# Patient Record
Sex: FEMALE | Race: OTHER | Employment: FULL TIME | URBAN - METROPOLITAN AREA
[De-identification: names, ages, dates, MRNs, and addresses within clinical notes are randomized per-mention and may not be internally consistent; named-entity substitution may affect disease eponyms.]

---

## 2017-11-02 PROBLEM — G89.29 CHRONIC BILATERAL THORACIC BACK PAIN: Status: ACTIVE | Noted: 2017-11-02

## 2017-11-02 PROBLEM — F90.0 ATTENTION DEFICIT HYPERACTIVITY DISORDER (ADHD), PREDOMINANTLY INATTENTIVE TYPE: Status: ACTIVE | Noted: 2017-11-02

## 2017-11-02 PROBLEM — M54.9 BACK PAIN: Status: ACTIVE | Noted: 2017-11-02

## 2017-11-02 PROBLEM — M54.6 CHRONIC BILATERAL THORACIC BACK PAIN: Status: ACTIVE | Noted: 2017-11-02

## 2017-11-02 PROBLEM — G47.01 INSOMNIA DUE TO MEDICAL CONDITION: Status: ACTIVE | Noted: 2017-11-02

## 2017-11-02 PROBLEM — F32.A DEPRESSION: Status: ACTIVE | Noted: 2017-11-02

## 2017-11-08 PROBLEM — Z86.59 HISTORY OF BULIMIA: Status: ACTIVE | Noted: 2017-11-08

## 2017-11-08 PROBLEM — A60.00 HERPES GENITALIA: Status: ACTIVE | Noted: 2017-11-08

## 2019-10-15 PROBLEM — Z01.419 WOMEN'S ANNUAL ROUTINE GYNECOLOGICAL EXAMINATION: Status: ACTIVE | Noted: 2019-10-15

## 2019-10-15 PROBLEM — N92.6 IRREGULAR MENSES: Status: ACTIVE | Noted: 2019-10-15

## 2019-10-15 PROBLEM — L68.0 HIRSUTISM: Status: ACTIVE | Noted: 2019-10-15

## 2019-10-29 PROBLEM — R87.810 CERVICAL HIGH RISK HPV (HUMAN PAPILLOMAVIRUS) TEST POSITIVE: Status: ACTIVE | Noted: 2019-10-29

## 2022-02-22 ENCOUNTER — HOSPITAL ENCOUNTER (EMERGENCY)
Age: 37
Discharge: HOME | End: 2022-02-22
Attending: EMERGENCY MEDICINE
Payer: OTHER MISCELLANEOUS

## 2022-02-22 VITALS
TEMPERATURE: 98.3 F | WEIGHT: 135 LBS | HEIGHT: 59 IN | OXYGEN SATURATION: 100 % | HEART RATE: 89 BPM | RESPIRATION RATE: 16 BRPM | BODY MASS INDEX: 27.21 KG/M2

## 2022-02-22 DIAGNOSIS — Z20.6 HIV EXPOSURE FROM BODY FLUIDS: Primary | ICD-10-CM

## 2022-02-22 PROCEDURE — 99282 EMERGENCY DEPT VISIT SF MDM: CPT

## 2022-02-22 NOTE — ED TRIAGE NOTES
Patient was pulling blood off central line of a confirmed HIV positive patinet this morning and possible with blood splashing into left eye after dropping syringe around 0600  . Patient advise she flushed eye x 3 at that time. Masked. No further complaints.

## 2022-02-22 NOTE — ED PROVIDER NOTES
HPI     Chidi Wood is 31 y.o. female who presents to the emergency department for evaluation after an HIV exposure. She works as a nurse in the ICU. She was drawing blood off a patient's central line and believes a dime sized amount of blood splashed in her eye. She washed her eye out with 3 saline flushes. She presents for needlestick protocol labs. She believes the patient is compliant with his antiretroviral medications. Past Medical History:   Diagnosis Date    Abnormal vaginal Pap smear     \"highest risk\" . Colposcopy negative.     Acne     ADHD     Back pain     Depression     History of HPV infection     Migraine        Past Surgical History:   Procedure Laterality Date    HX WISDOM TEETH EXTRACTION           Family History:   Problem Relation Age of Onset    Cancer Mother         cervical cancer     Hypertension Father        Social History     Socioeconomic History    Marital status: SINGLE     Spouse name: Not on file    Number of children: Not on file    Years of education: Not on file    Highest education level: Not on file   Occupational History    Occupation: nursing student   Tobacco Use    Smoking status: Never Smoker    Smokeless tobacco: Never Used   Substance and Sexual Activity    Alcohol use: Yes     Comment: very rare    Drug use: No    Sexual activity: Yes     Partners: Male     Birth control/protection: None   Other Topics Concern     Service Not Asked    Blood Transfusions Not Asked    Caffeine Concern Yes    Occupational Exposure Not Asked    Hobby Hazards Not Asked    Sleep Concern Not Asked    Stress Concern Not Asked    Weight Concern Not Asked    Special Diet Not Asked    Back Care Not Asked    Exercise No    Bike Helmet Not Asked    Seat Belt Yes    Self-Exams Yes   Social History Narrative    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    Abuse: Feels safe at home, no history of physical abuse, no history of sexual abuse Social Determinants of Health     Financial Resource Strain:     Difficulty of Paying Living Expenses: Not on file   Food Insecurity:     Worried About Running Out of Food in the Last Year: Not on file    Clive of Food in the Last Year: Not on file   Transportation Needs:     Lack of Transportation (Medical): Not on file    Lack of Transportation (Non-Medical): Not on file   Physical Activity:     Days of Exercise per Week: Not on file    Minutes of Exercise per Session: Not on file   Stress:     Feeling of Stress : Not on file   Social Connections:     Frequency of Communication with Friends and Family: Not on file    Frequency of Social Gatherings with Friends and Family: Not on file    Attends Sabianism Services: Not on file    Active Member of [address] [place] [address] [employer] or Organizations: Not on file    Attends Club or Organization Meetings: Not on file    Marital Status: Not on file   Intimate Partner Violence:     Fear of Current or Ex-Partner: Not on file    Emotionally Abused: Not on file    Physically Abused: Not on file    Sexually Abused: Not on file   Housing Stability:     Unable to Pay for Housing in the Last Year: Not on file    Number of [place] in the Last Year: Not on file    Unstable Housing in the Last Year: Not on file         ALLERGIES: Patient has no known allergies. Review of Systems   All other systems reviewed and are negative. Vitals:    02/22/22 1130   Pulse: 89   Resp: 16   Temp: 98.3 °F (36.8 °C)   SpO2: 100%   Weight: 61.2 kg (135 lb)   Height: 4' 11\" (1.499 m)            Physical Exam  Vitals and nursing note reviewed. Constitutional:       Appearance: Normal appearance. Eyes:      Pupils: Pupils are equal, round, and reactive to light. Cardiovascular:      Rate and Rhythm: Normal rate and regular rhythm. Pulmonary:      Effort: Pulmonary effort is normal.      Breath sounds: Normal breath sounds. Skin:     General: Skin is warm and dry.    Neurological: General: No focal deficit present. Mental Status: She is alert and oriented to person, place, and time. Psychiatric:         Mood and Affect: Mood normal.          MDM  Number of Diagnoses or Management Options  HIV exposure from body fluids: new and requires workup  Patient Progress  Patient progress: stable    ED Course as of 02/22/22 1337   Tue Feb 22, 2022   1320 Discussed and offered post exposure prophylaxis medications with the patient. She declines at this time. She will follow up with [facility] for further testing. Results, plan of care and return precautions discussed with the patient. They verbalize understanding and ability to comply.     [AE]      ED Course User Index  [AE] DESHAUN Ordaz       Procedures

## 2022-02-22 NOTE — ED NOTES
I have reviewed discharge instructions with the patient. The patient verbalized understanding. Patient left ED via Discharge Method: ambulatory to Home with self. Opportunity for questions and clarification provided. Patient given 0 scripts. To continue your aftercare when you leave the hospital, you may receive an automated call from our care team to check in on how you are doing. This is a free service and part of our promise to provide the best care and service to meet your aftercare needs.  If you have questions, or wish to unsubscribe from this service please call [phone]. Thank you for Choosing our [facility] Emergency Department.

## 2022-03-19 PROBLEM — F90.0 ATTENTION DEFICIT HYPERACTIVITY DISORDER (ADHD), PREDOMINANTLY INATTENTIVE TYPE: Status: ACTIVE | Noted: 2017-11-02

## 2022-03-19 PROBLEM — L68.0 HIRSUTISM: Status: ACTIVE | Noted: 2019-10-15

## 2022-03-19 PROBLEM — M54.6 CHRONIC BILATERAL THORACIC BACK PAIN: Status: ACTIVE | Noted: 2017-11-02

## 2022-03-19 PROBLEM — G89.29 CHRONIC BILATERAL THORACIC BACK PAIN: Status: ACTIVE | Noted: 2017-11-02

## 2022-03-19 PROBLEM — G47.01 INSOMNIA DUE TO MEDICAL CONDITION: Status: ACTIVE | Noted: 2017-11-02

## 2022-03-19 PROBLEM — N92.6 IRREGULAR MENSES: Status: ACTIVE | Noted: 2019-10-15

## 2022-03-19 PROBLEM — R87.810 CERVICAL HIGH RISK HPV (HUMAN PAPILLOMAVIRUS) TEST POSITIVE: Status: ACTIVE | Noted: 2019-10-29

## 2022-03-19 PROBLEM — F32.A DEPRESSION: Status: ACTIVE | Noted: 2017-11-02

## 2022-03-20 PROBLEM — A60.00 HERPES GENITALIA: Status: ACTIVE | Noted: 2017-11-08

## 2022-03-20 PROBLEM — Z86.59 HISTORY OF BULIMIA: Status: ACTIVE | Noted: 2017-11-08

## 2022-03-20 PROBLEM — Z01.419 WOMEN'S ANNUAL ROUTINE GYNECOLOGICAL EXAMINATION: Status: ACTIVE | Noted: 2019-10-15

## 2022-05-14 ENCOUNTER — HOSPITAL ENCOUNTER (EMERGENCY)
Age: 37
Discharge: HOME | End: 2022-05-14
Attending: STUDENT IN AN ORGANIZED HEALTH CARE EDUCATION/TRAINING PROGRAM
Payer: COMMERCIAL

## 2022-05-14 VITALS
WEIGHT: 134.92 LBS | TEMPERATURE: 98 F | OXYGEN SATURATION: 98 % | RESPIRATION RATE: 16 BRPM | HEART RATE: 88 BPM | DIASTOLIC BLOOD PRESSURE: 79 MMHG | BODY MASS INDEX: 27.2 KG/M2 | HEIGHT: 59 IN | SYSTOLIC BLOOD PRESSURE: 124 MMHG

## 2022-05-14 DIAGNOSIS — H10.31 ACUTE CONJUNCTIVITIS OF RIGHT EYE, UNSPECIFIED ACUTE CONJUNCTIVITIS TYPE: Primary | ICD-10-CM

## 2022-05-14 PROCEDURE — 99283 EMERGENCY DEPT VISIT LOW MDM: CPT

## 2022-05-14 PROCEDURE — 74011000250 HC RX REV CODE- 250: Performed by: STUDENT IN AN ORGANIZED HEALTH CARE EDUCATION/TRAINING PROGRAM

## 2022-05-14 PROCEDURE — 74011250637 HC RX REV CODE- 250/637: Performed by: STUDENT IN AN ORGANIZED HEALTH CARE EDUCATION/TRAINING PROGRAM

## 2022-05-14 RX ORDER — ERYTHROMYCIN 5 MG/G
OINTMENT OPHTHALMIC
Status: COMPLETED | OUTPATIENT
Start: 2022-05-14 | End: 2022-05-14

## 2022-05-14 RX ORDER — ERYTHROMYCIN 5 MG/G
OINTMENT OPHTHALMIC
Qty: 1 G | Refills: 0 | Status: SHIPPED | OUTPATIENT
Start: 2022-05-14

## 2022-05-14 RX ADMIN — ERYTHROMYCIN: 5 OINTMENT OPHTHALMIC at 02:42

## 2022-05-14 RX ADMIN — FLUORESCEIN SODIUM 1 STRIP: 1 STRIP OPHTHALMIC at 02:33

## 2022-05-14 NOTE — ED TRIAGE NOTES
Pt presents to the ED for c/o right eye pain that began earlier today. States that she was in the tanning bed and didn't wear her eye care.   Masked on arrival

## 2022-05-14 NOTE — DISCHARGE INSTRUCTIONS
Arrange follow-up with your eye provider as discussed. Use the topical antibiotic ointment as directed. Avoid contact use in the affected eye until symptoms improve. You should discard contacts use previously as a precaution.

## 2022-05-14 NOTE — ED NOTES
Pt states that she is having eye pain after being in a tanning bed without eye protection.  Right eye reddened and appears to be painful

## 2022-05-14 NOTE — ED NOTES
I have reviewed discharge instructions with the patient. The patient verbalized understanding. Patient left ED via Discharge Method: ambulatory to Home with , self,). Opportunity for questions and clarification provided. Patient given 1 scripts. To continue your aftercare when you leave the hospital, you may receive an automated call from our care team to check in on how you are doing. This is a free service and part of our promise to provide the best care and service to meet your aftercare needs.  If you have questions, or wish to unsubscribe from this service please call [phone]. Thank you for Choosing our [facility] Emergency Department.

## 2022-05-14 NOTE — ED PROVIDER NOTES
73-year-old female patient presents to this facility with reports of right eye irritation for the past day. Patient states she woke with symptoms today. She reports recent visit to the tanning bed and states she did not wear glasses or eye protection during the event. She denies significant pain but has noted redness and irritation in the right eye. Vision has been stable. She denies any significant discomfort with moving the eye. She does describe slight foreign body sensation generally through the eye. She has noticed some clear drainage from the affected eye. Left side is unremarkable. Of note, she did have short eyelashes placed within the past 2 days as well. Patient denies known sick contacts or allergies. She does use contact lenses. She is established with a local  with whom she follows up regularly. The history is provided by the patient. No  was used. Eye Pain   Associated symptoms include discharge and eye redness. Pertinent negatives include no photophobia and no pain. Past Medical History:   Diagnosis Date    Abnormal vaginal Pap smear     \"highest risk\" . Colposcopy negative.     Acne     ADHD     Back pain     Depression     History of HPV infection     Migraine        Past Surgical History:   Procedure Laterality Date    HX WISDOM TEETH EXTRACTION           Family History:   Problem Relation Age of Onset    Cancer Mother         cervical cancer     Hypertension Father        Social History     Socioeconomic History    Marital status: SINGLE     Spouse name: Not on file    Number of children: Not on file    Years of education: Not on file    Highest education level: Not on file   Occupational History    Occupation: nursing student   Tobacco Use    Smoking status: Never Smoker    Smokeless tobacco: Never Used   Substance and Sexual Activity    Alcohol use: Yes     Comment: very rare    Drug use: No    Sexual activity: Yes Partners: Male     Birth control/protection: None   Other Topics Concern     Service Not Asked    Blood Transfusions Not Asked    Caffeine Concern Yes    Occupational Exposure Not Asked    Hobby Hazards Not Asked    Sleep Concern Not Asked    Stress Concern Not Asked    Weight Concern Not Asked    Special Diet Not Asked    Back Care Not Asked    Exercise No    Bike Helmet Not Asked    Seat Belt Yes    Self-Exams Yes   Social History Narrative    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    Abuse: Feels safe at home, no history of physical abuse, no history of sexual abuse     Social Determinants of Health     Financial Resource Strain:     Difficulty of Paying Living Expenses: Not on file   Food Insecurity:     Worried About Running Out of Food in the Last Year: Not on file    Clive of Food in the Last Year: Not on file   Transportation Needs:     Lack of Transportation (Medical): Not on file    Lack of Transportation (Non-Medical): Not on file   Physical Activity:     Days of Exercise per Week: Not on file    Minutes of Exercise per Session: Not on file   Stress:     Feeling of Stress : Not on file   Social Connections:     Frequency of Communication with Friends and Family: Not on file    Frequency of Social Gatherings with Friends and Family: Not on file    Attends Mormonism Services: Not on file    Active Member of [address] [place] [address] [employer] or Organizations: Not on file    Attends Club or Organization Meetings: Not on file    Marital Status: Not on file   Intimate Partner Violence:     Fear of Current or Ex-Partner: Not on file    Emotionally Abused: Not on file    Physically Abused: Not on file    Sexually Abused: Not on file   Housing Stability:     Unable to Pay for Housing in the Last Year: Not on file    Number of [place] in the Last Year: Not on file    Unstable Housing in the Last Year: Not on file         ALLERGIES: Patient has no known allergies.     Review of Systems Eyes: Positive for discharge and redness. Negative for photophobia, pain, itching and visual disturbance. All other systems reviewed and are negative. Vitals:    05/14/22 0235   BP: 124/79   Pulse: 88   Resp: 16   Temp: 98 °F (36.7 °C)   SpO2: 98%   Weight: 61.2 kg (134 lb 14.7 oz)   Height: 4' 11\" (1.499 m)            Physical Exam  Vitals and nursing note reviewed. Constitutional:       Appearance: Normal appearance. HENT:      Head: Normocephalic and atraumatic. Nose: Nose normal.      Mouth/Throat:      Mouth: Mucous membranes are moist.   Eyes:      General: Lids are normal. Lids are everted, no foreign bodies appreciated. Vision grossly intact. Gaze aligned appropriately. No allergic shiner or scleral icterus. Right eye: Discharge present. No foreign body or hordeolum. Left eye: No foreign body, discharge or hordeolum. Extraocular Movements: Extraocular movements intact. Right eye: Normal extraocular motion and no nystagmus. Left eye: Normal extraocular motion and no nystagmus. Conjunctiva/sclera:      Right eye: Right conjunctiva is injected. No chemosis, exudate or hemorrhage. Left eye: Left conjunctiva is not injected. No chemosis, exudate or hemorrhage. Pupils: Pupils are equal, round, and reactive to light. Pupils are equal.      Comments: Slight though diffuse injection of the sclera on the right side. Some clear tearing is noted on my exam.  No significant photophobia, pupils equal round reactive to light bilaterally. Extraocular muscle movements are intact and painless. Is fluorescein uptake on the right side, left side is unremarkable. Cardiovascular:      Pulses: Normal pulses. Pulmonary:      Effort: Pulmonary effort is normal.   Abdominal:      General: Abdomen is flat. Musculoskeletal:         General: Normal range of motion. Cervical back: Normal range of motion. Skin:     General: Skin is warm and dry.       Capillary Refill: Capillary refill takes less than 2 seconds. Neurological:      General: No focal deficit present. Mental Status: She is alert. Psychiatric:         Mood and Affect: Mood normal.          MDM  Number of Diagnoses or Management Options  Acute conjunctivitis of right eye, unspecified acute conjunctivitis type: new and requires workup  Diagnosis management comments: Symptoms consistent with conjunctivitis versus keratitis from tanning bed. Reports minimal pain. Will treat with topical antibiotic ointment and refer to patient's outpatient eye provider. Voice dictation software was used during the making of this note. This software is not perfect and grammatical and other typographical errors may be present. This note has been proofread, but may still contain errors.   [address] [employer] [facility]; 5/14/2022 @2:47 AM   ===================================================================      Risk of Complications, Morbidity, and/or Mortality  Presenting problems: low  Diagnostic procedures: low  Management options: low           Procedures

## 2022-05-16 ENCOUNTER — PATIENT OUTREACH (OUTPATIENT)
Dept: OTHER | Age: 37
End: 2022-05-16

## 2022-08-01 ENCOUNTER — OFFICE VISIT (OUTPATIENT)
Dept: OCCUPATIONAL MEDICINE | Age: 37
End: 2022-08-01
Payer: COMMERCIAL

## 2022-08-01 VITALS
DIASTOLIC BLOOD PRESSURE: 73 MMHG | RESPIRATION RATE: 16 BRPM | TEMPERATURE: 98.8 F | HEIGHT: 71 IN | BODY MASS INDEX: 19.12 KG/M2 | OXYGEN SATURATION: 97 % | HEART RATE: 80 BPM | WEIGHT: 136.6 LBS | SYSTOLIC BLOOD PRESSURE: 111 MMHG

## 2022-08-01 DIAGNOSIS — J02.9 SORE THROAT: ICD-10-CM

## 2022-08-01 DIAGNOSIS — M26.609 TMJ (TEMPOROMANDIBULAR JOINT SYNDROME): ICD-10-CM

## 2022-08-01 DIAGNOSIS — B00.1 COLD SORE: Primary | ICD-10-CM

## 2022-08-01 LAB
GROUP A STREP ANTIGEN, POC: NEGATIVE
VALID INTERNAL CONTROL, POC: NORMAL

## 2022-08-01 PROCEDURE — 99213 OFFICE O/P EST LOW 20 MIN: CPT

## 2022-08-01 PROCEDURE — 87430 STREP A AG IA: CPT

## 2022-08-01 RX ORDER — VALACYCLOVIR HYDROCHLORIDE 1 G/1
1000 TABLET, FILM COATED ORAL 2 TIMES DAILY
Qty: 4 TABLET | Refills: 0 | Status: SHIPPED | OUTPATIENT
Start: 2022-08-01 | End: 2022-08-03

## 2022-08-01 RX ORDER — ACYCLOVIR 800 MG/1
TABLET ORAL
COMMUNITY
Start: 2022-07-19

## 2022-08-01 ASSESSMENT — ENCOUNTER SYMPTOMS
COUGH: 0
CHEST TIGHTNESS: 0
SHORTNESS OF BREATH: 0
BACK PAIN: 1
VOICE CHANGE: 0
SINUS PRESSURE: 1
EYE PAIN: 0
SINUS PAIN: 0
DIARRHEA: 0
NAUSEA: 0
TROUBLE SWALLOWING: 0
EYE ITCHING: 0
RHINORRHEA: 1
EYE DISCHARGE: 0
VOMITING: 0
SORE THROAT: 1
ABDOMINAL PAIN: 0
WHEEZING: 0
EYE REDNESS: 0
PHOTOPHOBIA: 0

## 2022-08-01 ASSESSMENT — VISUAL ACUITY: OU: 1

## 2022-08-01 NOTE — PROGRESS NOTES
PROGRESS NOTE    SUBJECTIVE     Kan Coelho is a 31 y.o. female seen for:    Chief Complaint    Pain     . Patient states that a week ago Saturday (7[place]23[place]22) [place] and then had a cold sore on Wednesday. Patient has been taking Abreva and acyclovir 400 BID for 5 days (finishing acyclovir today; prescribed by \"[facility]\") but it hasn't gotten any better. Patient states that she has a history of cold sores but hasn't had a cold sore in years and her cold sores are not usually triggered by sunlight. Patient states that her mouth is very irritated by the cold sore but that now her throat is hurting her (left side is worse) and it is hard to swallow. Patient also feels that her left lymph node is inflamed. Patient denies fevers but notes a headache behind her left eye. (Patient has a history of migraines/headaches.) Then, on Friday or Saturday, her right ear started hurting. Recently, the patient was diagnosed with PCOS and a uterine fibroid - she sees Dr. Joanne Roper (OBGYN) and is scheduled to have the uterine fibroid removed in September 2022; Starting Myfembree (contains Relugolix to reduce estrogen; Estradiol; and Norethindrone acetate) once it is approved by insurance but unitil then is still on oral combined birth control. In general, patient notes that she always has had trouble sleeping but has been on the night shift for the last 8 years. Patient admits to using benadryl for sleep but has also tried trazadone (didn't help); melatonin (but maxed out and was told to stop taking it). Patient feels like she has too much to think about when going to bed; has not tried meditation or therapy. Current Outpatient Medications   Medication Sig Dispense Refill    acyclovir (ZOVIRAX) 800 MG tablet Take 1/2 tablet (400mg) by mouth in the morning and evening each day for suppression for 90 days.       valACYclovir (VALTREX) 1 g tablet Take 1 tablet by mouth in the morning and 1 tablet before bedtime. Do all this for 2 days. 4 tablet 0    amphetamine-dextroamphetamine (ADDERALL XR) 10 MG extended release capsule Take 10 mg by mouth. amphetamine-dextroamphetamine (ADDERALL) 10 MG tablet Take 10 mg by mouth. Norethin Ace-Eth Estrad-FE 1-20 MG-MCG(24) CAPS Take 1 capsule by mouth daily       No current facility-administered medications for this visit. No Known Allergies  Past Medical History:   Diagnosis Date    Abnormal vaginal Pap smear     \"highest risk\" . Colposcopy negative. Acne     ADHD     Back pain     Depression     History of HPV infection     Migraine     PCOS (polycystic ovarian syndrome)     Subserous leiomyoma of uterus      Past Surgical History:   Procedure Laterality Date    WISDOM TOOTH EXTRACTION         Social History     Tobacco Use    Smoking status: Never    Smokeless tobacco: Never   Vaping Use    Vaping Use: Never used   Substance Use Topics    Alcohol use: Yes     Comment: Occassionally - 3 coctails every other weekends    Drug use: No        Family History   Problem Relation Age of Onset    Hypertension Father     Cancer Mother         cervical cancer        Review of Systems   Constitutional:  Positive for fatigue. Negative for chills, diaphoresis, fever and unexpected weight change. Weight gain over the past year; recent diagnosis of PCOS   HENT:  Positive for congestion, ear pain, mouth sores, rhinorrhea, sinus pressure and sore throat. Negative for ear discharge, postnasal drip, sinus pain, sneezing, trouble swallowing and voice change. Eyes:  Negative for photophobia, pain, discharge, redness, itching and visual disturbance. Respiratory:  Negative for cough, chest tightness, shortness of breath and wheezing. Cardiovascular:  Negative for chest pain and palpitations. Gastrointestinal:  Negative for abdominal pain, diarrhea, nausea and vomiting. Genitourinary:  Negative for dysuria and vaginal bleeding. Musculoskeletal:  Positive for back pain. Negative for gait problem, joint swelling, myalgias and neck stiffness. Patient has had middle of the back pain for several years; has done PT and it has helped somewhat   Skin:  Negative for rash. Neurological:  Positive for headaches. Psychiatric/Behavioral:  Positive for sleep disturbance. Negative for dysphoric mood and suicidal ideas. See HPI       OBJECTIVE    /73 (Site: Right Upper Arm, Position: Sitting, Cuff Size: Medium Adult)   Pulse 80   Temp 98.8 °F (37.1 °C)   Resp 16   Ht 5' 11\" (1.803 m)   Wt 136 lb 9.6 oz (62 kg)   LMP 07/24/2022 (Approximate)   SpO2 97%   BMI 19.05 kg/m²      Physical Exam  Vitals reviewed. Constitutional:       General: She is not in acute distress. Appearance: Normal appearance. She is not ill-appearing, toxic-appearing or diaphoretic. HENT:      Head: Normocephalic. Jaw: Tenderness and pain on movement present. No trismus or swelling. Comments: Pain upon opening and closing mouth (worse on the left side); facial movements symmetric; sensation intact and symmetric on both sides of face     Right Ear: Hearing, tympanic membrane, ear canal and external ear normal. There is no impacted cerumen. Left Ear: Hearing, tympanic membrane, ear canal and external ear normal. There is no impacted cerumen. Nose: Congestion and rhinorrhea present. Right Nostril: No foreign body, epistaxis or occlusion. Left Nostril: No foreign body, epistaxis or occlusion. Right Turbinates: Not swollen. Left Turbinates: Not swollen. Right Sinus: No maxillary sinus tenderness or frontal sinus tenderness. Left Sinus: No maxillary sinus tenderness or frontal sinus tenderness. Comments: Reddened turbinates     Mouth/Throat:      Lips: Lesions present. Mouth: Mucous membranes are moist.      Pharynx: Oropharynx is clear. Uvula midline.  Posterior oropharyngeal erythema present. No pharyngeal swelling, oropharyngeal exudate or uvula swelling. Tonsils: No tonsillar exudate or tonsillar abscesses. 2+ on the right. 2+ on the left. Comments: Cobblestone and erythemic appearance of pharynx; tonsils slightly erythemic but  no exudates  Eyes:      General: Lids are normal. Vision grossly intact. Gaze aligned appropriately. No allergic shiner, visual field deficit or scleral icterus. Right eye: No foreign body, discharge or hordeolum. Left eye: No foreign body, discharge or hordeolum. Extraocular Movements: Extraocular movements intact. Right eye: Normal extraocular motion and no nystagmus. Left eye: Normal extraocular motion and no nystagmus. Conjunctiva/sclera: Conjunctivae normal.      Right eye: Right conjunctiva is not injected. No chemosis, exudate or hemorrhage. Left eye: Left conjunctiva is not injected. No chemosis, exudate or hemorrhage. Pupils: Pupils are equal, round, and reactive to light. Cardiovascular:      Rate and Rhythm: Normal rate and regular rhythm. Pulses: Normal pulses. Radial pulses are 2+ on the right side and 2+ on the left side. Heart sounds: Normal heart sounds. No murmur heard. No friction rub. No gallop. Pulmonary:      Effort: Pulmonary effort is normal. No respiratory distress. Breath sounds: Normal breath sounds. No stridor. No wheezing, rhonchi or rales. Musculoskeletal:         General: Normal range of motion. Cervical back: Normal range of motion and neck supple. No rigidity. Right lower leg: No edema. Left lower leg: No edema. Lymphadenopathy:      Cervical: No cervical adenopathy. Skin:     General: Skin is warm and dry. Capillary Refill: Capillary refill takes less than 2 seconds. Coloration: Skin is not jaundiced or pale. Findings: No erythema or rash. Neurological:      General: No focal deficit present.       Mental Status: She is alert and oriented to person, place, and time. Mental status is at baseline. Motor: No weakness. Coordination: Coordination normal.      Gait: Gait normal.   Psychiatric:         Mood and Affect: Mood normal.         Behavior: Behavior normal.         Thought Content: Thought content normal.         Judgment: Judgment normal.       Results for orders placed or performed in visit on 08/01/22   AMB POC RAPID STREP TEST   Result Value Ref Range    Valid Internal Control, POC      Group A Strep Antigen, POC Negative Negative        No results found for: WBC, HGB, HCT, MCV, PLT    Lab Results   Component Value Date    GLUCOSE 81 10/17/2019           Lab Results   Component Value Date/Time    GLUCOSE 81 10/17/2019 08:14 AM        No results found for: LABA1C  No results found for: EAG    No results found for: TSHFT4, TSH    ASSESSMENT and PLAN    Chelsea was seen today for pain. Diagnoses and all orders for this visit:    Cold sore  -     valACYclovir (VALTREX) 1 g tablet; Take 1 tablet by mouth in the morning and 1 tablet before bedtime. Do all this for 2 days. Sore throat  -     AMB POC RAPID STREP TEST    TMJ (temporomandibular joint syndrome)    Will switch anti-viral agent to see if it is more effective at helping the cold sore heal. Stop acyclovir. Educated patient on using the neti pot, including boiling the water first and letting it cool to a warm temperature before performing the sinus rinse. When ready to perform sinus rinse, pour the warm water into the neti pot and add a saline packet to the water. Gentle swirl the water in the pot to distribute the contents of the saline packet. It is best to do neti pot in the morning and night and use the Flonase after doing a sinus rinse. Gargle with the leftover salt water from the neti pot.     For the Flonase nasal spray, you have the option to use the spray once or twice a day - if once a day, spray 2 puffs into each nostril and if twice a day, spray 1 puff into each nostril. Spray with your head down and and try to spray within the nose as opposed to down the back of your throat. Flonase takes several days to a week to work so continue to use this daily to see the best effects. Symptoms likely related to viral upper respiratory infection with TMJ causing increased pain on the left side. Will continue to monitor and plan to check in with patient on Wednesday to see if symptoms have improved or worsened. For sleep issues, discussed improved sleep hygiene habits, including setting up a sleep routine that is the same every night and could include a warm shower, warm decaffeinated tea, reading from a physical book or [place] (no screens/TVs), or meditation. Also advised that counseling may help to find improved coping mechanisms for constant thoughts that keep her up. Counseled on benefits of having a primary care provider which includes, but is not limited to, continuity of care and having a medical home when concerns arise. Also enforced that onsite clinic policy states that we are not to take the place of a primary care provider, pt verbalized understanding. SEs and risk vs benefits associated with medications prescribed discussed with patient who verbalized understanding. Pt verbalized understanding and agreement with plan of care. RTC for persisting/worsening symptoms or new complaints that arise. Discussed signs and symptoms that would warrant immediate evaluation including, but not limited to HA, blurred vision, speech disturbance, difficulty with ambulation/gait, numbness, tingling, weakness, syncope, chest pain, or shortness of breath. I have reviewed the patient's medication list, past medical, family, social, and surgical history in detail and updated the patient record appropriately.     LAILA Mcconnell NP

## 2022-09-13 NOTE — H&P
Name Caio Pfeiffer (32KQ, F) ID# [id number]    [address] [facility]  Provider Agusto Evans MD  Insurance   Med Primary[place] [employer]  Insurance # : [id number]  Policy/Group # : [id number]  Prescription: [address] - Member is eligible. details  Prescription: MEDIMPACT - Member is eligible. details  Prescription: [address] - Member is ineligible. Patient found on payor's files, but not covered on date of inquiry. details  Chief Complaint  Preop Major Surgery  Patient's Pharmacies  [id number]): [address], [address] [facility], [address] [facility], Ph [phone], Fax [phone] [address] [place] [address]): Sumit Young, [address] [facility], [address] [facility], Ph [phone], Fax   2022 11:38 am  Ht: 4 ft 11.5 in  Wt: 130 lbs  BMI: 25.8  BP: 102/62  O2Sat: 99%  Pulse: 63 bpm  Allergies  Reviewed Allergies  NKDA  Medications  Reviewed Medications  Acyclovir 10% Ointment  Apply to affected area up to 6 times daily. Apply at the earliest signs of an outbreak.  22   filled surescripts  acyclovir 800 mg tablet  Take 1/2 tablet (400mg) by mouth in the morning and evening each day for suppression for 90 days. 22   filled surescripts  ibuprofen 800 mg tablet  Take 1 tablet(s) every 6-8 hours by oral route. 22   prescribed Alireza Brooks MD  oxyCODONE 5 mg tablet  Take 1 tablet(s) every 4 hours by oral route. 22   prescribed Alireza Brooks MD  Zofran 4 mg tablet  Take 1 tablet(s) every 4-6 hours by oral route. 22   prescribed Alireza Brooks MD  Vaccines  Vaccines not reviewed (last reviewed 2022)  Vaccine Type Date Amt. Route Site [address] Lot # Mfr. Exp.   Date VIS VIS  Given Vaccinator  COVID-19  COVID-19 (SARS-COV-2) vaccine, unspecified 21            COVID-19 (SARS-COV-2) vaccine, unspecified 21            Problems  Reviewed Problems  No known problems  Genital herpes simplex - Onset: 10/05/2021  Subserous leiomyoma of uterus - Onset: 09/30/2021  Prolactinoma - Onset: 10/05/2021  Hyperprolactinemia - Onset: 10/05/2021  Hemorrhoids - Onset: 10/05/2021  Secondary dysmenorrhea - Onset: 09/30/2021  Excessive and frequent menstruation - Onset: 09/30/2021  Polycystic ovary syndrome - Onset: 10/05/2021  GYN History  GYN History not reviewed (last reviewed 06/01/2022)  HPV Vaccine: N.  Date of Last Pap Smear: 05/01/2020. Abnormal Pap: Y (Notes: has had colpo but denies any cryo or cx surgery). CRYO: N.  LEEP: N. Sexually Active?: Y.  STIs/STDs: Y (Notes: + HPV). Current Birth Control Method: Condoms. Age at Menarche: 17.  Date of LMP: 01/30/2022. Frequency of Cycle (Q days): (Notes: Irregular). Flow: Light. Dysmenorrhea: Y. PCOS: N.  ENDOMETRIOSIS: N.  UTERINE FIBROIDS: Y. Oophorectomy: N. Removal of Cervix: N.  Obstetric History  Obstetric History not reviewed (last reviewed 06/01/2022)  TOTAL FULL PRE AB. I AB. S ECTOPICS MULTIPLE LIVING  1 0   1   0  Family History  Family History not reviewed (last reviewed 06/01/2022)  Mother - Cryosurgery of lesion of cervix  No hx of uterine,colon,ovarion,breast ca. 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  Marriage and Sexuality  What is your relationship status?: Single  Are you sexually active?: Yes  Substance Use  Do you or have you ever smoked tobacco?: Never smoker  Do you or have you ever used any other forms of tobacco or nicotine?: No  What was the date of your most recent tobacco screening?: 06/01/2022  What is your level of alcohol consumption?: Occasional  Do you use any illicit or recreational drugs?: No  What is your level of caffeine consumption?: Occasional  Gender Identity and [address] Identity  First name used:  Say  Surgical History  Surgical History not reviewed (last reviewed 06/01/2022)  Extraction of wisdom tooth  Past Medical History  Past Medical History not reviewed (last reviewed 02/16/2022)  Notes: Fibroid 10cm  HPI  Say comes in for pre-op for Robotic Assisted Laparoscopic Myomectomy and Chromotubation on _.  ROS  Patient reports no fever, no fatigue, and no significant weight loss/gain. She reports no chest pain, no palpitations, no SOB, no orthopnea, and no edema. She reports no heartburn, no indigestion, no difficulty swallowing, no nausea, no vomiting, no abdominal pain, and no bowel movement changes. She reports no hematuria, no abnormal bleeding, no flank pain, no difficulty urinating, no incontinence, no rash, no lesion, no discharge, no vaginal odor, and no vaginal itching. She reports no endocrine symptoms. She reports no PMDD symptoms. She reports no menopausal symptoms. She reports no sexual problems. She reports no depression and no alcoholism. She reports no muscle aches, no muscle weakness, no arthralgias/joint pain, no back pain, and no swelling in the extremities. She reports no loss of consciousness, no weakness, no numbness, no seizures, no dizziness, and no headaches. Physical Exam  Constitutional: General Appearance: healthy-appearing, well-nourished, and well-developed. Level of Distress: NAD. Ambulation: ambulating normally. Psychiatric: Insight: good judgement. Mental Status: normal mood and affect and active and alert. Orientation: to time, place, and person. Memory: recent memory normal and remote memory normal.    Head: Head: normocephalic and atraumatic. Eyes: Lids and Conjunctivae: no discharge or pallor and non-injected. Pupils: PERRLA. Corneas: grossly intact. Fundoscopic: no exudates or hemorrhages and grossly normal except where noted. EOM: EOMI. Lens: clear. Sclerae: non-icteric. Vision: peripheral vision grossly intact and acuity grossly intact.     ENMT: Ears: no lesions on external ear. Hearing: no hearing loss. Nose: no lesions on external nose, septal deviation, or nasal discharge and nares patent. Lips, Teeth, and Gums: no mouth or lip ulcers or bleeding gums and normal dentition. Oropharynx: no erythema or exudates and moist mucous membranes and tonsils not enlarged. Neck: Neck: FROM, trachea midline, and no masses. Lymph Nodes: no cervical LAD, supraclavicular LAD, axillary LAD, or inguinal LAD. Thyroid: no enlargement or nodules and non-tender. Lungs: Respiratory effort: no dyspnea. Percussion: no dullness, flatness, or hyperresonance. Auscultation: no wheezing, rales/crackles, or rhonchi and breath sounds normal, good air movement, and CTA except as noted. Cardiovascular: Heart Auscultation: normal S1 and S2; no murmurs, rubs, or gallops; and RRR. Neck vessels: no carotid bruits. Pulses including femoral / pedal: normal throughout. Abdomen: Bowel Sounds: normal. Inspection and Palpation: no tenderness, guarding, masses, rebound tenderness, or CVA tenderness and soft and non-distended. Hernia: none palpable. Musculoskeletal[de-identified] Motor Strength and Tone: normal tone and motor strength. Joints, Bones, and Muscles: no contractures, malalignment, tenderness, or bony abnormalities and normal movement of all extremities. Extremities: no cyanosis, edema, varicosities, or palpable cord. Neurologic: Gait and Station: normal gait and station. Cranial Nerves: grossly intact. Sensation: grossly intact and monofilament test intact. Reflexes: DTRs 2+ bilaterally throughout. Coordination and Cerebellum: finger-to-nose intact and no tremor. Skin: Inspection and palpation: no rash, lesions, ulcer, induration, nodules, jaundice, or abnormal nevi and good turgor. Nails: normal.    Back: Thoracolumbar Appearance: normal curvature.   Assessment / Plan  PROCEDURE: Robotic Assisted Laparoscopic Myomectomy and Chromotubation    The risks of the procedure were discussed in detail, including hemorrhage, blood clots, infection, damage to other organs such as bowel, bladder, ureters, nerves and vessels. Also, the possible need for catheter use at home after the procedure was discussed. Pt understands that complications are not anticipated, but that if they should occur then corrective procedures would be performed as indicated including, but not limited to:  transfusion, antibiotics and additional surgical procedures including modification/extension of incision (possible vertical incision), need for prolonged catheter drainage should urologic injury occur & other procedures as indicated. Unanticipated reactions to anesthesia as well as the small possibility of death were all discussed. All of pt questions were answered. Discussed possible side effects to medications and signs of infection. Discussed that pt may not drive for 2 wks, may not lift anymore then 20 lbs, and nothing in vagina such as tampons, intercourse, or baths until she is cleared at her 8 wk post-op appointment. Pt understands, states wants to proceed with surgery, and signed consent in office. Pre-op meds order  RTO in 8 wks for post- op visit. Oxycodone 5 mg tablet  Zofran 4 mg tablet  Ibuprofen 800 mg tablet        1. Excessive and frequent menstruation  N92.1: Excessive and frequent menstruation with irregular cycle    2. Subserous leiomyoma of uterus  D25.2: Subserosal leiomyoma of uterus  oxycodone 5 mg tablet - To be submitted on or around 09/13/2022     Take 1 tablet(s) every 4 hours by oral route. Qty: 30 tablet(s)     Refills: 0     Pharmacy: [facility] #[id number]  Zofran 4 mg tablet - To be submitted on or around 09/13/2022     Take 1 tablet(s) every 4-6 hours by oral route. Qty: 30 tablet(s)     Refills: 2     Pharmacy: [facility] #[id number]  ibuprofen 800 mg tablet - To be submitted on or around 09/13/2022     Take 1 tablet(s) every 6-8 hours by oral route.      Qty: 120 tablet(s) Refills: 1     Pharmacy: [place] [id number] #[id number]    3.  Secondary dysmenorrhea  N94.5: Secondary dysmenorrhea      Return to Office  Adrianne Novak MD for PREOP/PO/PP at [facility] on 10/12/2022 at 09:45 AM

## 2022-09-19 ENCOUNTER — ANESTHESIA EVENT (OUTPATIENT)
Dept: SURGERY | Age: 37
End: 2022-09-19
Payer: COMMERCIAL

## 2022-09-19 NOTE — PROGRESS NOTES
Patient verified name and . Order for consent NOT found in EHR and matches case posting; patient verifies procedure. Type 3 surgery, phone assessment complete. Orders were received. Labs per surgeon: CBC, UA, Type & Screen (DOS, patient unable to come prior)  Labs per anesthesia protocol: BMP (DOS, patient unable to come prior)    Patient answered medical/surgical history questions at their best of ability. All prior to admission medications documented in [facility]. Patient instructed to take the following medications the day of surgery according to anesthesia guidelines with a small sip of water: acyclovir, adderall. On the day before surgery please take Acetaminophen 1000mg in the morning and then again before bed. You may substitute for Tylenol 650 mg. Hold all vitamins 7 days prior to surgery and NSAIDS 5 days prior to surgery. Prescription meds to hold:none  Patient instructed on the following:    > Arrive at A Entrance, time of arrival to be called the day before by 1700  > NPO after midnight, unless otherwise indicated, including gum, mints, and ice chips  > Responsible adult must drive patient to the hospital, stay during surgery, and patient will need supervision 24 hours after anesthesia  > Use antibacterial soap in shower the night before surgery and on the morning of surgery  > All piercings must be removed prior to arrival.    > Leave all valuables (money and jewelry) at home but bring insurance card and ID on DOS.   > You may be required to pay a deductible or co-pay on the day of your procedure. You can pre-pay by calling [phone] if your surgery is at the [facility] or [phone] if your surgery is at the [facility]. > Do not wear make-up, nail polish, lotions, cologne, perfumes, powders, or oil on skin. Artificial nails are not permitted.

## 2022-09-20 ENCOUNTER — HOSPITAL ENCOUNTER (OUTPATIENT)
Age: 37
Setting detail: OBSERVATION
Discharge: HOME | End: 2022-09-20
Attending: OBSTETRICS & GYNECOLOGY | Admitting: OBSTETRICS & GYNECOLOGY
Payer: COMMERCIAL

## 2022-09-20 ENCOUNTER — ANESTHESIA (OUTPATIENT)
Dept: SURGERY | Age: 37
End: 2022-09-20
Payer: COMMERCIAL

## 2022-09-20 VITALS
HEIGHT: 59 IN | RESPIRATION RATE: 14 BRPM | HEART RATE: 70 BPM | OXYGEN SATURATION: 100 % | BODY MASS INDEX: 26.25 KG/M2 | WEIGHT: 130.2 LBS | DIASTOLIC BLOOD PRESSURE: 60 MMHG | SYSTOLIC BLOOD PRESSURE: 102 MMHG | TEMPERATURE: 98 F

## 2022-09-20 PROBLEM — D25.1 INTRAMURAL LEIOMYOMA OF UTERUS: Status: ACTIVE | Noted: 2022-09-20

## 2022-09-20 LAB
ABO + RH BLD: NORMAL
ANION GAP SERPL CALC-SCNC: 1 MMOL/L (ref 4–13)
APPEARANCE UR: CLEAR
BACTERIA URNS QL MICRO: 0 /HPF
BILIRUB UR QL: NEGATIVE
BLOOD GROUP ANTIBODIES SERPL: NORMAL
BUN SERPL-MCNC: 9 MG/DL (ref 6–23)
CALCIUM SERPL-MCNC: 8.7 MG/DL (ref 8.3–10.4)
CHLORIDE SERPL-SCNC: 115 MMOL/L (ref 101–110)
CO2 SERPL-SCNC: 22 MMOL/L (ref 21–32)
COLOR UR: ABNORMAL
CREAT SERPL-MCNC: 0.81 MG/DL (ref 0.6–1)
ERYTHROCYTE [DISTWIDTH] IN BLOOD BY AUTOMATED COUNT: 14.7 % (ref 11.9–14.6)
GLUCOSE SERPL-MCNC: 78 MG/DL (ref 65–100)
GLUCOSE UR STRIP.AUTO-MCNC: NEGATIVE MG/DL
HCG UR QL: NEGATIVE
HCT VFR BLD AUTO: 40.6 % (ref 35.8–46.3)
HGB BLD-MCNC: 13.2 G/DL (ref 11.7–15.4)
HGB UR QL STRIP: ABNORMAL
KETONES UR QL STRIP.AUTO: NEGATIVE MG/DL
LEUKOCYTE ESTERASE UR QL STRIP.AUTO: NEGATIVE
MCH RBC QN AUTO: 30.6 PG (ref 26.1–32.9)
MCHC RBC AUTO-ENTMCNC: 32.5 G/DL (ref 31.4–35)
MCV RBC AUTO: 94.2 FL (ref 79.6–97.8)
NITRITE UR QL STRIP.AUTO: NEGATIVE
NRBC # BLD: 0 K/UL (ref 0–0.2)
PH UR STRIP: 5.5 [PH] (ref 5–9)
PLATELET # BLD AUTO: 213 K/UL (ref 150–450)
PMV BLD AUTO: 11 FL (ref 9.4–12.3)
POTASSIUM SERPL-SCNC: 3.8 MMOL/L (ref 3.5–5.1)
PROT UR STRIP-MCNC: NEGATIVE MG/DL
RBC # BLD AUTO: 4.31 M/UL (ref 4.05–5.2)
RBC #/AREA URNS HPF: ABNORMAL /HPF
SODIUM SERPL-SCNC: 138 MMOL/L (ref 136–145)
SP GR UR REFRACTOMETRY: 1.01 (ref 1–1.02)
SPECIMEN EXP DATE BLD: NORMAL
UROBILINOGEN UR QL STRIP.AUTO: 0.2 EU/DL (ref 0.2–1)
WBC # BLD AUTO: 5.9 K/UL (ref 4.3–11.1)
WBC URNS QL MICRO: ABNORMAL /HPF

## 2022-09-20 PROCEDURE — 2709999900 HC NON-CHARGEABLE SUPPLY: Performed by: OBSTETRICS & GYNECOLOGY

## 2022-09-20 PROCEDURE — 88305 TISSUE EXAM BY PATHOLOGIST: CPT

## 2022-09-20 PROCEDURE — 3600000009 HC SURGERY ROBOT BASE: Performed by: OBSTETRICS & GYNECOLOGY

## 2022-09-20 PROCEDURE — 7100000000 HC PACU RECOVERY - FIRST 15 MIN: Performed by: OBSTETRICS & GYNECOLOGY

## 2022-09-20 PROCEDURE — C1765 ADHESION BARRIER: HCPCS | Performed by: OBSTETRICS & GYNECOLOGY

## 2022-09-20 PROCEDURE — 6360000002 HC RX W HCPCS: Performed by: OBSTETRICS & GYNECOLOGY

## 2022-09-20 PROCEDURE — 2500000003 HC RX 250 WO HCPCS: Performed by: NURSE ANESTHETIST, CERTIFIED REGISTERED

## 2022-09-20 PROCEDURE — 96374 THER/PROPH/DIAG INJ IV PUSH: CPT

## 2022-09-20 PROCEDURE — 80048 BASIC METABOLIC PNL TOTAL CA: CPT

## 2022-09-20 PROCEDURE — 3700000001 HC ADD 15 MINUTES (ANESTHESIA): Performed by: OBSTETRICS & GYNECOLOGY

## 2022-09-20 PROCEDURE — 2580000003 HC RX 258: Performed by: OBSTETRICS & GYNECOLOGY

## 2022-09-20 PROCEDURE — 3600000019 HC SURGERY ROBOT ADDTL 15MIN: Performed by: OBSTETRICS & GYNECOLOGY

## 2022-09-20 PROCEDURE — 6370000000 HC RX 637 (ALT 250 FOR IP): Performed by: ANESTHESIOLOGY

## 2022-09-20 PROCEDURE — 6360000002 HC RX W HCPCS: Performed by: ANESTHESIOLOGY

## 2022-09-20 PROCEDURE — 6360000002 HC RX W HCPCS: Performed by: REGISTERED NURSE

## 2022-09-20 PROCEDURE — 3700000000 HC ANESTHESIA ATTENDED CARE: Performed by: OBSTETRICS & GYNECOLOGY

## 2022-09-20 PROCEDURE — 7100000010 HC PHASE II RECOVERY - FIRST 15 MIN: Performed by: OBSTETRICS & GYNECOLOGY

## 2022-09-20 PROCEDURE — 7100000001 HC PACU RECOVERY - ADDTL 15 MIN: Performed by: OBSTETRICS & GYNECOLOGY

## 2022-09-20 PROCEDURE — 2720000010 HC SURG SUPPLY STERILE: Performed by: OBSTETRICS & GYNECOLOGY

## 2022-09-20 PROCEDURE — 81025 URINE PREGNANCY TEST: CPT

## 2022-09-20 PROCEDURE — 2500000003 HC RX 250 WO HCPCS: Performed by: ANESTHESIOLOGY

## 2022-09-20 PROCEDURE — 2500000003 HC RX 250 WO HCPCS: Performed by: REGISTERED NURSE

## 2022-09-20 PROCEDURE — 86901 BLOOD TYPING SEROLOGIC RH(D): CPT

## 2022-09-20 PROCEDURE — 85027 COMPLETE CBC AUTOMATED: CPT

## 2022-09-20 PROCEDURE — 2500000003 HC RX 250 WO HCPCS: Performed by: OBSTETRICS & GYNECOLOGY

## 2022-09-20 PROCEDURE — S2900 ROBOTIC SURGICAL SYSTEM: HCPCS | Performed by: OBSTETRICS & GYNECOLOGY

## 2022-09-20 PROCEDURE — 7100000011 HC PHASE II RECOVERY - ADDTL 15 MIN: Performed by: OBSTETRICS & GYNECOLOGY

## 2022-09-20 PROCEDURE — 81001 URINALYSIS AUTO W/SCOPE: CPT

## 2022-09-20 RX ORDER — ROCURONIUM BROMIDE 10 MG/ML
INJECTION, SOLUTION INTRAVENOUS PRN
Status: DISCONTINUED | OUTPATIENT
Start: 2022-09-20 | End: 2022-09-20 | Stop reason: SDUPTHER

## 2022-09-20 RX ORDER — ONDANSETRON 2 MG/ML
4 INJECTION INTRAMUSCULAR; INTRAVENOUS
Status: COMPLETED | OUTPATIENT
Start: 2022-09-20 | End: 2022-09-20

## 2022-09-20 RX ORDER — SODIUM CHLORIDE 0.9 % (FLUSH) 0.9 %
5-40 SYRINGE (ML) INJECTION PRN
Status: DISCONTINUED | OUTPATIENT
Start: 2022-09-20 | End: 2022-09-20 | Stop reason: HOSPADM

## 2022-09-20 RX ORDER — LIDOCAINE HYDROCHLORIDE 20 MG/ML
INJECTION, SOLUTION EPIDURAL; INFILTRATION; INTRACAUDAL; PERINEURAL PRN
Status: DISCONTINUED | OUTPATIENT
Start: 2022-09-20 | End: 2022-09-20 | Stop reason: SDUPTHER

## 2022-09-20 RX ORDER — MIDAZOLAM HYDROCHLORIDE 2 MG/2ML
2 INJECTION, SOLUTION INTRAMUSCULAR; INTRAVENOUS
Status: COMPLETED | OUTPATIENT
Start: 2022-09-20 | End: 2022-09-20

## 2022-09-20 RX ORDER — SODIUM CHLORIDE 9 MG/ML
INJECTION, SOLUTION INTRAVENOUS PRN
Status: DISCONTINUED | OUTPATIENT
Start: 2022-09-20 | End: 2022-09-20 | Stop reason: HOSPADM

## 2022-09-20 RX ORDER — ACETAMINOPHEN 500 MG
1000 TABLET ORAL ONCE
Status: COMPLETED | OUTPATIENT
Start: 2022-09-20 | End: 2022-09-20

## 2022-09-20 RX ORDER — HYDROMORPHONE HCL 110MG/55ML
PATIENT CONTROLLED ANALGESIA SYRINGE INTRAVENOUS PRN
Status: DISCONTINUED | OUTPATIENT
Start: 2022-09-20 | End: 2022-09-20 | Stop reason: SDUPTHER

## 2022-09-20 RX ORDER — GLYCOPYRROLATE 0.2 MG/ML
INJECTION INTRAMUSCULAR; INTRAVENOUS PRN
Status: DISCONTINUED | OUTPATIENT
Start: 2022-09-20 | End: 2022-09-20 | Stop reason: SDUPTHER

## 2022-09-20 RX ORDER — HYDROMORPHONE HYDROCHLORIDE 1 MG/ML
0.5 INJECTION, SOLUTION INTRAMUSCULAR; INTRAVENOUS; SUBCUTANEOUS EVERY 5 MIN PRN
Status: DISCONTINUED | OUTPATIENT
Start: 2022-09-20 | End: 2022-09-20 | Stop reason: HOSPADM

## 2022-09-20 RX ORDER — LIDOCAINE HYDROCHLORIDE 10 MG/ML
1 INJECTION, SOLUTION INFILTRATION; PERINEURAL
Status: COMPLETED | OUTPATIENT
Start: 2022-09-20 | End: 2022-09-20

## 2022-09-20 RX ORDER — KETOROLAC TROMETHAMINE 30 MG/ML
INJECTION, SOLUTION INTRAMUSCULAR; INTRAVENOUS PRN
Status: DISCONTINUED | OUTPATIENT
Start: 2022-09-20 | End: 2022-09-20 | Stop reason: SDUPTHER

## 2022-09-20 RX ORDER — SODIUM CHLORIDE, SODIUM LACTATE, POTASSIUM CHLORIDE, CALCIUM CHLORIDE 600; 310; 30; 20 MG/100ML; MG/100ML; MG/100ML; MG/100ML
INJECTION, SOLUTION INTRAVENOUS CONTINUOUS
Status: DISCONTINUED | OUTPATIENT
Start: 2022-09-20 | End: 2022-09-20 | Stop reason: HOSPADM

## 2022-09-20 RX ORDER — SODIUM CHLORIDE 9 MG/ML
INJECTION, SOLUTION INTRAVENOUS PRN
Status: DISCONTINUED | OUTPATIENT
Start: 2022-09-20 | End: 2022-09-20

## 2022-09-20 RX ORDER — SODIUM CHLORIDE 0.9 % (FLUSH) 0.9 %
5-40 SYRINGE (ML) INJECTION PRN
Status: DISCONTINUED | OUTPATIENT
Start: 2022-09-20 | End: 2022-09-20

## 2022-09-20 RX ORDER — OXYCODONE HYDROCHLORIDE 5 MG/1
5 TABLET ORAL PRN
Status: DISCONTINUED | OUTPATIENT
Start: 2022-09-20 | End: 2022-09-20 | Stop reason: HOSPADM

## 2022-09-20 RX ORDER — NEOSTIGMINE METHYLSULFATE 1 MG/ML
INJECTION, SOLUTION INTRAVENOUS PRN
Status: DISCONTINUED | OUTPATIENT
Start: 2022-09-20 | End: 2022-09-20 | Stop reason: SDUPTHER

## 2022-09-20 RX ORDER — SODIUM CHLORIDE 0.9 % (FLUSH) 0.9 %
5-40 SYRINGE (ML) INJECTION EVERY 12 HOURS SCHEDULED
Status: DISCONTINUED | OUTPATIENT
Start: 2022-09-20 | End: 2022-09-20 | Stop reason: HOSPADM

## 2022-09-20 RX ORDER — SODIUM CHLORIDE 0.9 % (FLUSH) 0.9 %
5-40 SYRINGE (ML) INJECTION EVERY 12 HOURS SCHEDULED
Status: DISCONTINUED | OUTPATIENT
Start: 2022-09-20 | End: 2022-09-20

## 2022-09-20 RX ORDER — PROPOFOL 10 MG/ML
INJECTION, EMULSION INTRAVENOUS PRN
Status: DISCONTINUED | OUTPATIENT
Start: 2022-09-20 | End: 2022-09-20 | Stop reason: SDUPTHER

## 2022-09-20 RX ORDER — DIPHENHYDRAMINE HYDROCHLORIDE 50 MG/ML
12.5 INJECTION INTRAMUSCULAR; INTRAVENOUS
Status: DISCONTINUED | OUTPATIENT
Start: 2022-09-20 | End: 2022-09-20 | Stop reason: HOSPADM

## 2022-09-20 RX ORDER — FENTANYL CITRATE 50 UG/ML
INJECTION, SOLUTION INTRAMUSCULAR; INTRAVENOUS PRN
Status: DISCONTINUED | OUTPATIENT
Start: 2022-09-20 | End: 2022-09-20 | Stop reason: SDUPTHER

## 2022-09-20 RX ORDER — ONDANSETRON 2 MG/ML
INJECTION INTRAMUSCULAR; INTRAVENOUS PRN
Status: DISCONTINUED | OUTPATIENT
Start: 2022-09-20 | End: 2022-09-20 | Stop reason: SDUPTHER

## 2022-09-20 RX ORDER — PROCHLORPERAZINE EDISYLATE 5 MG/ML
5 INJECTION INTRAMUSCULAR; INTRAVENOUS
Status: DISCONTINUED | OUTPATIENT
Start: 2022-09-20 | End: 2022-09-20 | Stop reason: HOSPADM

## 2022-09-20 RX ORDER — DEXAMETHASONE SODIUM PHOSPHATE 10 MG/ML
INJECTION INTRAMUSCULAR; INTRAVENOUS PRN
Status: DISCONTINUED | OUTPATIENT
Start: 2022-09-20 | End: 2022-09-20 | Stop reason: SDUPTHER

## 2022-09-20 RX ORDER — SODIUM CHLORIDE, SODIUM LACTATE, POTASSIUM CHLORIDE, CALCIUM CHLORIDE 600; 310; 30; 20 MG/100ML; MG/100ML; MG/100ML; MG/100ML
INJECTION, SOLUTION INTRAVENOUS CONTINUOUS
Status: DISCONTINUED | OUTPATIENT
Start: 2022-09-20 | End: 2022-09-20

## 2022-09-20 RX ORDER — KETAMINE HYDROCHLORIDE 50 MG/ML
INJECTION, SOLUTION, CONCENTRATE INTRAMUSCULAR; INTRAVENOUS PRN
Status: DISCONTINUED | OUTPATIENT
Start: 2022-09-20 | End: 2022-09-20 | Stop reason: SDUPTHER

## 2022-09-20 RX ORDER — EPHEDRINE SULFATE/0.9% NACL/PF 50 MG/5 ML
SYRINGE (ML) INTRAVENOUS PRN
Status: DISCONTINUED | OUTPATIENT
Start: 2022-09-20 | End: 2022-09-20 | Stop reason: SDUPTHER

## 2022-09-20 RX ADMIN — FENTANYL CITRATE 50 MCG: 50 INJECTION, SOLUTION INTRAMUSCULAR; INTRAVENOUS at 08:13

## 2022-09-20 RX ADMIN — ROCURONIUM BROMIDE 5 MG: 50 INJECTION, SOLUTION INTRAVENOUS at 11:45

## 2022-09-20 RX ADMIN — Medication 10 MG: at 12:38

## 2022-09-20 RX ADMIN — FENTANYL CITRATE 50 MCG: 50 INJECTION, SOLUTION INTRAMUSCULAR; INTRAVENOUS at 08:02

## 2022-09-20 RX ADMIN — ACETAMINOPHEN 1000 MG: 500 TABLET, FILM COATED ORAL at 07:21

## 2022-09-20 RX ADMIN — Medication 2000 MG: at 12:12

## 2022-09-20 RX ADMIN — MIDAZOLAM HYDROCHLORIDE 2 MG: 1 INJECTION, SOLUTION INTRAMUSCULAR; INTRAVENOUS at 07:32

## 2022-09-20 RX ADMIN — ROCURONIUM BROMIDE 10 MG: 50 INJECTION, SOLUTION INTRAVENOUS at 10:16

## 2022-09-20 RX ADMIN — Medication 10 MG: at 10:48

## 2022-09-20 RX ADMIN — Medication 2000 MG: at 08:09

## 2022-09-20 RX ADMIN — Medication 10 MG: at 08:45

## 2022-09-20 RX ADMIN — PROPOFOL 200 MG: 10 INJECTION, EMULSION INTRAVENOUS at 08:01

## 2022-09-20 RX ADMIN — KETAMINE HYDROCHLORIDE 15 MG: 50 INJECTION, SOLUTION INTRAMUSCULAR; INTRAVENOUS at 09:25

## 2022-09-20 RX ADMIN — ONDANSETRON 4 MG: 2 INJECTION INTRAMUSCULAR; INTRAVENOUS at 13:40

## 2022-09-20 RX ADMIN — LIDOCAINE HYDROCHLORIDE 1 ML: 10 INJECTION, SOLUTION INFILTRATION; PERINEURAL at 07:19

## 2022-09-20 RX ADMIN — GLYCOPYRROLATE 0.4 MG: 0.2 INJECTION, SOLUTION INTRAMUSCULAR; INTRAVENOUS at 13:13

## 2022-09-20 RX ADMIN — HYDROMORPHONE HYDROCHLORIDE 0.5 MG: 1 INJECTION, SOLUTION INTRAMUSCULAR; INTRAVENOUS; SUBCUTANEOUS at 13:55

## 2022-09-20 RX ADMIN — SODIUM CHLORIDE, SODIUM LACTATE, POTASSIUM CHLORIDE, AND CALCIUM CHLORIDE: 600; 310; 30; 20 INJECTION, SOLUTION INTRAVENOUS at 08:43

## 2022-09-20 RX ADMIN — PHENYLEPHRINE HYDROCHLORIDE 100 MCG: 0.1 INJECTION, SOLUTION INTRAVENOUS at 11:11

## 2022-09-20 RX ADMIN — ROCURONIUM BROMIDE 10 MG: 50 INJECTION, SOLUTION INTRAVENOUS at 09:10

## 2022-09-20 RX ADMIN — KETAMINE HYDROCHLORIDE 15 MG: 50 INJECTION, SOLUTION INTRAMUSCULAR; INTRAVENOUS at 10:30

## 2022-09-20 RX ADMIN — SODIUM CHLORIDE, SODIUM LACTATE, POTASSIUM CHLORIDE, AND CALCIUM CHLORIDE: 600; 310; 30; 20 INJECTION, SOLUTION INTRAVENOUS at 07:20

## 2022-09-20 RX ADMIN — FENTANYL CITRATE 100 MCG: 50 INJECTION, SOLUTION INTRAMUSCULAR; INTRAVENOUS at 11:21

## 2022-09-20 RX ADMIN — SODIUM CHLORIDE, SODIUM LACTATE, POTASSIUM CHLORIDE, AND CALCIUM CHLORIDE: 600; 310; 30; 20 INJECTION, SOLUTION INTRAVENOUS at 10:32

## 2022-09-20 RX ADMIN — HYDROMORPHONE HYDROCHLORIDE 0.4 MG: 2 INJECTION INTRAMUSCULAR; INTRAVENOUS; SUBCUTANEOUS at 13:15

## 2022-09-20 RX ADMIN — ROCURONIUM BROMIDE 10 MG: 50 INJECTION, SOLUTION INTRAVENOUS at 08:13

## 2022-09-20 RX ADMIN — KETAMINE HYDROCHLORIDE 30 MG: 50 INJECTION, SOLUTION INTRAMUSCULAR; INTRAVENOUS at 08:10

## 2022-09-20 RX ADMIN — ROCURONIUM BROMIDE 10 MG: 50 INJECTION, SOLUTION INTRAVENOUS at 09:39

## 2022-09-20 RX ADMIN — ROCURONIUM BROMIDE 5 MG: 50 INJECTION, SOLUTION INTRAVENOUS at 12:10

## 2022-09-20 RX ADMIN — KETOROLAC TROMETHAMINE 30 MG: 30 INJECTION, SOLUTION INTRAMUSCULAR; INTRAVENOUS at 12:54

## 2022-09-20 RX ADMIN — SODIUM CHLORIDE, SODIUM LACTATE, POTASSIUM CHLORIDE, AND CALCIUM CHLORIDE: 600; 310; 30; 20 INJECTION, SOLUTION INTRAVENOUS at 12:13

## 2022-09-20 RX ADMIN — PHENYLEPHRINE HYDROCHLORIDE 50 MCG: 0.1 INJECTION, SOLUTION INTRAVENOUS at 12:32

## 2022-09-20 RX ADMIN — LIDOCAINE HYDROCHLORIDE 60 MG: 20 INJECTION, SOLUTION EPIDURAL; INFILTRATION; INTRACAUDAL; PERINEURAL at 08:01

## 2022-09-20 RX ADMIN — DEXAMETHASONE SODIUM PHOSPHATE 5 MG: 10 INJECTION INTRAMUSCULAR; INTRAVENOUS at 08:10

## 2022-09-20 RX ADMIN — HYDROMORPHONE HYDROCHLORIDE 0.5 MG: 1 INJECTION, SOLUTION INTRAMUSCULAR; INTRAVENOUS; SUBCUTANEOUS at 13:50

## 2022-09-20 RX ADMIN — ROCURONIUM BROMIDE 10 MG: 50 INJECTION, SOLUTION INTRAVENOUS at 10:57

## 2022-09-20 RX ADMIN — ROCURONIUM BROMIDE 40 MG: 50 INJECTION, SOLUTION INTRAVENOUS at 08:02

## 2022-09-20 RX ADMIN — PHENYLEPHRINE HYDROCHLORIDE 50 MCG: 0.1 INJECTION, SOLUTION INTRAVENOUS at 12:37

## 2022-09-20 RX ADMIN — ONDANSETRON 4 MG: 2 INJECTION INTRAMUSCULAR; INTRAVENOUS at 08:10

## 2022-09-20 RX ADMIN — Medication 3 MG: at 13:13

## 2022-09-20 ASSESSMENT — PAIN SCALES - GENERAL
PAINLEVEL_OUTOF10: 6
PAINLEVEL_OUTOF10: 0
PAINLEVEL_OUTOF10: 7
PAINLEVEL_OUTOF10: 5
PAINLEVEL_OUTOF10: 0
PAINLEVEL_OUTOF10: 0

## 2022-09-20 ASSESSMENT — LIFESTYLE VARIABLES: SMOKING_STATUS: 0

## 2022-09-20 ASSESSMENT — PAIN - FUNCTIONAL ASSESSMENT: PAIN_FUNCTIONAL_ASSESSMENT: 0-10

## 2022-09-20 NOTE — ANESTHESIA PRE PROCEDURE
Department of Anesthesiology  Preprocedure Note       Name:  Tj Dunlap   Age:  31 y.o.  :  1985                                          MRN:  [id number]         Date:  2022      Surgeon: Diana Littlejohn):  Harrington Gottron, MD    Procedure: Procedure(s):  UTERINE MYOMECTOMY ROBOTIC    Medications prior to admission:   Prior to Admission medications    Medication Sig Start Date End Date Taking? Authorizing Provider   acyclovir (ZOVIRAX) 800 MG tablet Take 1/2 tablet (400mg) by mouth in the morning and evening each day for suppression for 90 days. 22   Historical Provider, MD   amphetamine-dextroamphetamine (ADDERALL XR) 10 MG extended release capsule Take 10 mg by mouth. Ar Automatic Reconciliation   amphetamine-dextroamphetamine (ADDERALL) 10 MG tablet Take 10 mg by mouth.     Ar Automatic Reconciliation   Norethin Ace-Eth Estrad-FE 1-20 MG-MCG(24) CAPS Take 1 capsule by mouth daily  Patient not taking: Reported on 2022 1/10/20   Ar Automatic Reconciliation       Current medications:    Current Facility-Administered Medications   Medication Dose Route Frequency Provider Last Rate Last Admin    lactated ringers infusion   IntraVENous Continuous Harrington Gottron, MD        sodium chloride flush 0.9 % injection 5-40 mL  5-40 mL IntraVENous 2 times per day Harrington Gottron, MD        sodium chloride flush 0.9 % injection 5-40 mL  5-40 mL IntraVENous PRN Harrington Gottron, MD        0.9 % sodium chloride infusion   IntraVENous PRN Harrington Gottron, MD        ceFAZolin (ANCEF) 2000 mg in sterile water 20 mL IV syringe  2,000 mg IntraVENous On Call to Paula Giraldo MD        lidocaine 1 % injection 1 mL  1 mL IntraDERmal Once PRN Jessi Topete MD        acetaminophen (TYLENOL) tablet 1,000 mg  1,000 mg Oral Once Jessi Topete MD        midazolam PF (VERSED) injection 2 mg  2 mg IntraVENous Once PRN Jessi Topete MD           Allergies:  No Known Allergies    Problem List:    Patient Active Problem List   Diagnosis Code    Irregular menses N92.6    Cervical high risk HPV (human papillomavirus) test positive R87.810    Insomnia due to medical condition G47.01    Attention deficit hyperactivity disorder (ADHD), predominantly inattentive type F90.0    Hirsutism L68.0    Depression F32. A    Chronic bilateral thoracic back pain M54.6, G89.29    Women's annual routine gynecological examination Z01.419    History of bulimia Z86.59    Herpes genitalia A60.00    Intramural leiomyoma of uterus D25.1       Past Medical History:        Diagnosis Date    Abnormal vaginal Pap smear     \"highest risk\" . Colposcopy negative.     Acne     ADHD     Back pain     Depression     Herpes     History of HPV infection     Migraine     PCOS (polycystic ovarian syndrome)     Subserous leiomyoma of uterus        Past Surgical History:        Procedure Laterality Date    WISDOM TOOTH EXTRACTION         Social History:    Social History     Tobacco Use    Smoking status: Never    Smokeless tobacco: Never   Substance Use Topics    Alcohol use: Yes     Comment: Occassionally - 3 coctails every other weekends                                Counseling given: Not Answered      Vital Signs (Current):   Vitals:    09/19/22 0940 09/20/22 0700   BP:  118/83   Pulse:  94   Resp:  18   Temp:  98.3 °F (36.8 °C)   TempSrc:  Temporal   SpO2:  100%   Weight: 130 lb (59 kg) 130 lb 3.2 oz (59.1 kg)   Height: 4' 11\" (1.499 m)                                               BP Readings from Last 3 Encounters:   09/20/22 118/83   08/01/22 111/73       NPO Status: Time of last liquid consumption: 2100                        Time of last solid consumption: 2100                        Date of last liquid consumption: 09/19/22                        Date of last solid food consumption: 09/18/22    BMI:   Wt Readings from Last 3 Encounters:   09/20/22 130 lb 3.2 oz (59.1 kg)   08/01/22 136 lb 9.6 oz (62 kg)     Body mass index is 26.3 kg/m². CBC: No results found for: WBC, RBC, HGB, HCT, MCV, RDW, PLT    CMP:   Lab Results   Component Value Date/Time    GLUCOSE 81 10/17/2019 08:14 AM       POC Tests: No results for input(s): POCGLU, POCNA, POCK, POCCL, POCBUN, POCHEMO, POCHCT in the last 72 hours. Coags: No results found for: PROTIME, INR, APTT    HCG (If Applicable): No results found for: PREGTESTUR, PREGSERUM, HCG, HCGQUANT     ABGs: No results found for: PHART, PO2ART, TBC1ERD, NBL8IDD, BEART, F3QOIXPE     Type & Screen (If Applicable):  No results found for: LABABO, LABRH    Drug/Infectious Status (If Applicable):  No results found for: HIV, HEPCAB    COVID-19 Screening (If Applicable): No results found for: COVID19        Anesthesia Evaluation  Patient summary reviewed and Nursing notes reviewed no history of anesthetic complications:   Airway: Mallampati: I  TM distance: >3 FB     Mouth opening: > = 3 FB   Dental:          Pulmonary:normal exam        (-) not a current smoker                           Cardiovascular:  Exercise tolerance: good (>4 METS),           Rhythm: regular  Rate: normal                    Neuro/Psych:   (+) headaches: migraine headaches, psychiatric history (ADHD): stable with treatment            GI/Hepatic/Renal:             Endo/Other:                     Abdominal:             Vascular: negative vascular ROS. Other Findings:           Anesthesia Plan      general     ASA 2       Induction: intravenous. Anesthetic plan and risks discussed with patient. Use of blood products discussed with patient whom consented to blood products.                      Cayden Randle MD   9/20/2022

## 2022-09-20 NOTE — OP NOTE
Robot Assisted Laparoscopy/Bilateral ovarian cystotomies/Excision of endometriosis Procedure Note    DATE OF PROCEDURE:   9/20/22    PREOPERATIVE DIAGNOSIS:    Excessive and frequent menstruation with irregular cycle [N92.1]  Subserous leiomyoma of uterus [D25.2]  Intramural leiomyoma  Pelvic and perineal pain [R10.2]    POSTOPERATIVE DIAGNOSIS:  same    SURGEON:    Ronaldo Polk MD    ASSISTANTS:    na    PROCEDURE:  Robotic assisted Laparoscopy, Chromotubation, Myomectomy with complete rebuild of uterus structure, extensive work to perform the myomectomy requiring 2 hours of extra time over normal time of 2 hours (total of 4 hours)     SURGEON: Ronaldo Polk MD    ASSISTANT: na    ESTIMATED BLOOD LOSS:   100cc    ANESTHESIA: General.    PROCEDURE: The patient was taken to the operating room where general   anesthesia was found to be adequate. A time out was performed to everyone's satisfaction. Patient was prepped and draped in normal sterile fashion. A Snow catheter was placed into the urethra. A weighted speculum was placed in the vagina. The anterior lip of the cervix was grasped with a single-tooth tenaculum. The uterus was sounded to 14 cm. The cervix was dilated with Brandon dilators and an CHARLY uterine manipulator was inserted in the endometrial cavity for means of manipulation. All other instruments were then removed from the vagina. The patient was flattened so that attention could be turned to the abdomen. A 2 1/2 cm skin incision was made through the umbilicus. The umbilical stump was . The fascia was was incised with a scalpel and extended with Ma scissors to 2 1/2 cm. The peritoneum was entered bluntly with a hemostat and extended with Metzenbaum scissors. The Veress needle was   then inserted at a 45-degree angle. Intraperitoneal placement was confirmed with a water-filled syringe and a drop in cO2 pressure with insufflation. Opening pressure was -2 mmHg.  The abdomen was then insufflated to 3 liters of CO2 gas. Once the abdomen was insufflated, an 8 mm skin incision was made in the LUQ in the midclavicular line. The insufflating port was inserted under direct   visualization using an Optiscope. Intraperitoneal placement was confirmed again with the scope. Two more 8 mm skin incisions were made in the right lower quadrant and left lower quadrant, approximately 10 to 12 cm from the umbilicus on either side. Two robotic ports were then placed in these incisions under   direct visualization as well as one infraumbilically. Hemostasis was assured throughout. Survey of the patient's abdomen revealed a normal liver edge but appendix could not be seen. The uterus was moderately enlarged to 20 weeks size. A massive 14-16 cm fibroid was occupying the lower abdomen-- no normal tissue could be seen at that time. The ovaries and tubes appeared normal after much manipulation to see them. Vasopressin was injected through the abdominal wall into several areas of the fibroid to cause some vasoconstriction. Methylene blue was instilled through the CHARLY, but neither tube filled or spilled dye at this time. This may have been due to the large fibroid causing compression at the tubal ostia. Recommend reevaluation after complete healing from this surgery. At this point, the robot was then docked and I unscrubbed to take over at the   robotic console. A Synchroseal and a monopolar Endo hiren were used   robotically to perform the procedure. The serosa was then incised with the endoshears in a horizontal fashion. The serosa was detached from the fibroids with cautery and blunt dissection. This dissection was very difficult and time consuming. Once the serosa was 2/3 off the fibroid, large chunks of the fibroid were detached. 6 large pieces were removed to completely remove the fibroid. The endometrium appeared to be completely intact but was fully exposed except near the cervix.   There was still enough myometrium to cover the endometrium. A stratafix suture was used to reapproximate the myometrium completely over the endometrium. Good hemostasis was assured. This was reinforced with a second layer using another stratafix. The serosal was then closed in baseball stitch fashion using a stratafix. Good hemostasis was assured. Interceed was placed in pelvis over the incised areas for adhesion barrier. 4 different endobags were filled with the 6 pieces of fibroid for extraction. The robot was undocked. The Yehuda gel port cover was removed. The bags were brought to the opening and opened. Fibroid extraction from the bags was with a knife and thyroid clamps. After all bags were emptied and removed, a survey of the body cavity revealed no left behind tissue and continued goo hemostasis. The Yehuda ring was removed. The fascia  in the umbilical incision was then closed with 0-0 Vicryl. The subcuticular layers were then reapproximated using 2-0 plain gut. The skin was closed with 4-0 Vicryl subcuticularly. The other trocars were then removed. Dermabond was   used to close the skin incisions. Patient tolerated the procedure well. Sponge, lap, and needle counts were correct x2. The patient was taken to   recovery in stable condition.       Signed: Yobani Villarreal MD

## 2022-09-20 NOTE — DISCHARGE INSTRUCTIONS
Laparoscopic Myomectomy: What to Expect at [facility]  Laparoscopic myomectomy is surgery to remove one or more fibroids. Your doctor put a lighted tube (scope) and other tools through small cuts (incisions) in your belly. The doctor then removed the fibroids. After surgery, you may feel some pain in your belly for several days. Your belly may also be swollen. You may have a change in your bowel movements for a few days. And you may have some cramping for the first week. It's normal to also have some shoulder or back pain. This is caused by the gas your doctor put in your belly to help see your organs better. Your doctor may prescribe medicines for pain. You may need about 1 to 2 weeks to fully recover. It's important not to lift anything heavy for about 1 week. Your doctor may talk to you about when you can have sex and when it's safe to try to become pregnant. You may have a brown or reddish brown vaginal discharge or light vaginal bleeding or spotting for a few weeks. This is normal. Expect your first two periods to start early or late. They may be more painful or heavy than usual.  This care sheet gives you a general idea about how long it will take for you to recover. But each person recovers at a different pace. Follow the steps below to get better as quickly as possible. How can you care for yourself at home? Activity    Rest when you feel tired. Be active. Walking is a good choice. Allow your body to heal. Don't move quickly or lift anything heavy until you are feeling better. Ask your doctor when it is okay for you to have sex or use tampons. Do not douche. Hold a pillow over your incisions when you cough or take deep breaths. This will support your belly and may help to decrease your pain. Do breathing exercises at home as instructed by your doctor. This will help prevent pneumonia. Diet    You can eat your normal diet.  If your stomach is upset, try bland, are having problems. It's also a good idea to know your test results and keep a list of the medicines you take. When should you call for help? Call 911 anytime you think you may need emergency care. For example, call if:    You passed out (lost consciousness). You have chest pain, are short of breath, or cough up blood. Call your doctor now or seek immediate medical care if:    You have pain that does not get better after you take pain medicine. You cannot pass stools or gas. You have vaginal discharge that has increased in amount or smells bad. You are sick to your stomach or cannot drink fluids. You have loose stitches, or your incision comes open. Bright red blood has soaked through the bandage over your incision. You have signs of infection, such as: Increased pain, swelling, warmth, or redness. Red streaks leading from the incision. Pus draining from the incision. A fever. You have severe vaginal bleeding. This means that you are soaking through your usual pads every hour for 2 or more hours. You have signs of a blood clot in your leg (called a deep vein thrombosis), such as:  Pain in your calf, back of the knee, thigh, or groin. Redness and swelling in your leg. Watch closely for changes in your health, and be sure to contact your doctor if you have any problems. Where can you learn more? Go to https://[employer] Mediapecorneliaeb.Signicat. org and sign in to your [employer] account. Enter I358 in the [facility] box to learn more about \"Laparoscopic Myomectomy: What to Expect at Home. \"     If you do not have an account, please click on the \"Sign Up Now\" link. Current as of: November 22, 2021               Content Version: 13.4  © 6062-7537 [employer]wise, Incorporated. Care instructions adapted under license by [facility] ([facility]).  If you have questions about a medical condition or this instruction, always ask your healthcare professional. Arlene Taylor disclaims any warranty or liability for your use of this information.

## 2022-09-21 NOTE — ANESTHESIA POSTPROCEDURE EVALUATION
Department of Anesthesiology  Postprocedure Note    Patient: Vishal Sanchez  MRN: [id number]  YOB: 1985  Date of evaluation: 9/21/2022      Procedure Summary     Date: 09/20/22 Room / Location: [facility] MAIN OR 07 / [facility] MAIN OR    Anesthesia Start: 8288 Anesthesia Stop: 2578    Procedure: UTERINE MYOMECTOMY ROBOTIC/ CHROMOTUBATION/ LYSIS OF ADHESIONS (Abdomen) Diagnosis:       Polycystic ovaries      Excessive and frequent menstruation with irregular cycle      Subserous leiomyoma of uterus      Pelvic and perineal pain      (Polycystic ovaries [E28.2])      (Excessive and frequent menstruation with irregular cycle [N92.1])      (Subserous leiomyoma of uterus [D25.2])      (Pelvic and perineal pain [R10.2])    Surgeons: Pete Spears MD Responsible Provider: Farhan Birch MD    Anesthesia Type: general ASA Status: 2          Anesthesia Type: No value filed.     Ad Phase I: Ad Score: 8    Ad Phase II: Ad Score: 10      Anesthesia Post Evaluation    Patient location during evaluation: bedside  Patient participation: complete - patient participated  Level of consciousness: awake and alert  Pain score: 1  Airway patency: patent  Nausea & Vomiting: no vomiting  Complications: no  Cardiovascular status: hemodynamically stable  Respiratory status: acceptable  Hydration status: euvolemic

## 2023-08-21 ENCOUNTER — OFFICE VISIT (OUTPATIENT)
Dept: ORTHOPEDIC SURGERY | Age: 38
End: 2023-08-21
Payer: COMMERCIAL

## 2023-08-21 DIAGNOSIS — M25.561 RIGHT KNEE PAIN, UNSPECIFIED CHRONICITY: Primary | ICD-10-CM

## 2023-08-21 PROCEDURE — 99204 OFFICE O/P NEW MOD 45 MIN: CPT | Performed by: ORTHOPAEDIC SURGERY

## 2023-08-21 RX ORDER — DICLOFENAC SODIUM 75 MG/1
75 TABLET, DELAYED RELEASE ORAL 2 TIMES DAILY
Qty: 60 TABLET | Refills: 0 | Status: SHIPPED | OUTPATIENT
Start: 2023-08-21 | End: 2023-09-20

## 2023-08-21 RX ORDER — DICLOFENAC SODIUM 75 MG/1
75 TABLET, DELAYED RELEASE ORAL 2 TIMES DAILY
Qty: 60 TABLET | Refills: 0 | Status: SHIPPED | OUTPATIENT
Start: 2023-08-21

## 2023-08-21 NOTE — PROGRESS NOTES
Name: Hampton Baumgarten  YOB: 1985  Gender: female  MRN: [id number]      CC: Knee Pain (R)       HPI: Hampton Baumgarten is a 30 y.o. female who presents with Knee Pain (R)  Patient reports sudden onset of knee pain middle of June after a heavy day of lifting she was getting into her car and hit her knee on the dashboard with some force. She reports that she continued to perform her workouts and after a specific workout she noticed sudden onset swelling. She reports that she has intermittent pain that is present when she does deep squats. She reports that the pain localizes anterior medial and she gets popping in her knee. She reports that the worst pain she gets is with deep flexion. She reports some benefit with use of a compression sleeve and NSAIDs. ROS/Meds/PSH/PMH/FH/SH: I personally reviewed the patients standard intake form. Below are the pertinents    Tobacco:  reports that she has never smoked. She has never used smokeless tobacco.  Diabetes: none  Other: PCOS, ADHD    Physical Examination:  General: no acute distress  Lungs: breathing easily  CV: regular rhythm by pulse  Right Knee: Minimal effusion present. Tenderness to palpation inferior pole of the patella and medial joint line. Full active and passive range of motion with no pain in the extremes. Negative Lachman's, anterior drawer. Stable to varus and valgus stress at 0 and 30 degrees. Mild pain with Rai's localized to the medial compartment. Negative bounce home test.      Imaging:   Knee XR: 3 views     Clinical Indication  1. Right knee pain, unspecified chronicity           Report: AP, lateral, sunrise views of the Right knee demonstrates no acute fracture dislocation, or advanced degenerative changes    Impression: No acute findings as above           All imaging interpreted by myself John Cruz MD independent of radiology review        Assessment:     ICD-10-CM    1.  Right knee pain, unspecified

## 2023-09-07 NOTE — PROGRESS NOTES
the Right knee  was injected from a anterior lateral approach with 4 cc of 0.25% Marcaine and 1 cc of 40 mg Kenalog. The patient tolerated the procedure well was given postinjection flare precautions. Michelle Berg MD, [address] [facility]

## 2023-09-11 ENCOUNTER — OFFICE VISIT (OUTPATIENT)
Dept: ORTHOPEDIC SURGERY | Age: 38
End: 2023-09-11

## 2023-09-11 DIAGNOSIS — M25.561 RIGHT KNEE PAIN, UNSPECIFIED CHRONICITY: Primary | ICD-10-CM

## 2023-09-11 RX ORDER — TRIAMCINOLONE ACETONIDE 40 MG/ML
40 INJECTION, SUSPENSION INTRA-ARTICULAR; INTRAMUSCULAR ONCE
Status: COMPLETED | OUTPATIENT
Start: 2023-09-11 | End: 2023-09-11

## 2023-09-11 RX ADMIN — TRIAMCINOLONE ACETONIDE 40 MG: 40 INJECTION, SUSPENSION INTRA-ARTICULAR; INTRAMUSCULAR at 14:33

## 2023-11-08 ENCOUNTER — TELEPHONE (OUTPATIENT)
Dept: ORTHOPEDIC SURGERY | Age: 38
End: 2023-11-08

## 2023-11-08 RX ORDER — DICLOFENAC SODIUM 75 MG/1
75 TABLET, DELAYED RELEASE ORAL 2 TIMES DAILY
Qty: 60 TABLET | Refills: 1 | Status: SHIPPED | OUTPATIENT
Start: 2023-11-08

## 2023-11-08 NOTE — TELEPHONE ENCOUNTER
She is wondering if she can get a refill of her Voltaren sent to the pharmacy on file. She went on a 10 mile hike and aggravated her knee.

## 2024-04-25 RX ORDER — DICLOFENAC SODIUM 75 MG/1
75 TABLET, DELAYED RELEASE ORAL 2 TIMES DAILY
Qty: 60 TABLET | Refills: 1 | OUTPATIENT
Start: 2024-04-25

## 2024-09-09 ENCOUNTER — OFFICE VISIT (OUTPATIENT)
Dept: NEUROLOGY | Age: 39
End: 2024-09-09
Payer: COMMERCIAL

## 2024-09-09 VITALS
RESPIRATION RATE: 16 BRPM | HEART RATE: 75 BPM | BODY MASS INDEX: 26.61 KG/M2 | HEIGHT: 59 IN | WEIGHT: 132 LBS | DIASTOLIC BLOOD PRESSURE: 81 MMHG | SYSTOLIC BLOOD PRESSURE: 117 MMHG | OXYGEN SATURATION: 99 %

## 2024-09-09 DIAGNOSIS — G43.E09 CHRONIC MIGRAINE WITH AURA WITHOUT STATUS MIGRAINOSUS, NOT INTRACTABLE: Primary | ICD-10-CM

## 2024-09-09 PROCEDURE — 99204 OFFICE O/P NEW MOD 45 MIN: CPT | Performed by: PHYSICAL THERAPIST

## 2024-09-09 RX ORDER — TOPIRAMATE 50 MG/1
50 TABLET, FILM COATED ORAL NIGHTLY
Qty: 30 TABLET | Refills: 1 | Status: SHIPPED | OUTPATIENT
Start: 2024-09-09

## 2024-09-09 RX ORDER — ESTRADIOL 0.5 MG/1
0.5 TABLET ORAL DAILY
COMMUNITY
Start: 2024-09-01

## 2024-09-09 RX ORDER — NORETHINDRONE ACETATE AND ETHINYL ESTRADIOL, AND FERROUS FUMARATE 1MG-20(24)
KIT ORAL
COMMUNITY

## 2024-09-09 RX ORDER — TOPIRAMATE 25 MG/1
25 TABLET, FILM COATED ORAL 2 TIMES DAILY
COMMUNITY
End: 2024-09-09

## 2024-09-09 ASSESSMENT — PATIENT HEALTH QUESTIONNAIRE - PHQ9
SUM OF ALL RESPONSES TO PHQ QUESTIONS 1-9: 6
1. LITTLE INTEREST OR PLEASURE IN DOING THINGS: SEVERAL DAYS
5. POOR APPETITE OR OVEREATING: NOT AT ALL
8. MOVING OR SPEAKING SO SLOWLY THAT OTHER PEOPLE COULD HAVE NOTICED. OR THE OPPOSITE, BEING SO FIGETY OR RESTLESS THAT YOU HAVE BEEN MOVING AROUND A LOT MORE THAN USUAL: NOT AT ALL
SUM OF ALL RESPONSES TO PHQ9 QUESTIONS 1 & 2: 2
SUM OF ALL RESPONSES TO PHQ QUESTIONS 1-9: 6
SUM OF ALL RESPONSES TO PHQ QUESTIONS 1-9: 6
10. IF YOU CHECKED OFF ANY PROBLEMS, HOW DIFFICULT HAVE THESE PROBLEMS MADE IT FOR YOU TO DO YOUR WORK, TAKE CARE OF THINGS AT HOME, OR GET ALONG WITH OTHER PEOPLE: SOMEWHAT DIFFICULT
4. FEELING TIRED OR HAVING LITTLE ENERGY: SEVERAL DAYS
2. FEELING DOWN, DEPRESSED OR HOPELESS: SEVERAL DAYS
9. THOUGHTS THAT YOU WOULD BE BETTER OFF DEAD, OR OF HURTING YOURSELF: NOT AT ALL
6. FEELING BAD ABOUT YOURSELF - OR THAT YOU ARE A FAILURE OR HAVE LET YOURSELF OR YOUR FAMILY DOWN: NOT AT ALL
7. TROUBLE CONCENTRATING ON THINGS, SUCH AS READING THE NEWSPAPER OR WATCHING TELEVISION: NEARLY EVERY DAY
3. TROUBLE FALLING OR STAYING ASLEEP: NOT AT ALL
SUM OF ALL RESPONSES TO PHQ QUESTIONS 1-9: 6

## 2024-09-09 ASSESSMENT — ENCOUNTER SYMPTOMS
EYE PAIN: 0
TROUBLE SWALLOWING: 0
PHOTOPHOBIA: 1
RESPIRATORY NEGATIVE: 1
GASTROINTESTINAL NEGATIVE: 1
ALLERGIC/IMMUNOLOGIC NEGATIVE: 1
VOICE CHANGE: 0

## 2024-10-08 ENCOUNTER — OFFICE VISIT (OUTPATIENT)
Dept: NEUROLOGY | Age: 39
End: 2024-10-08
Payer: COMMERCIAL

## 2024-10-08 VITALS
OXYGEN SATURATION: 98 % | HEIGHT: 59 IN | HEART RATE: 85 BPM | SYSTOLIC BLOOD PRESSURE: 127 MMHG | WEIGHT: 130.4 LBS | BODY MASS INDEX: 26.29 KG/M2 | DIASTOLIC BLOOD PRESSURE: 83 MMHG

## 2024-10-08 DIAGNOSIS — G43.E09 CHRONIC MIGRAINE WITH AURA WITHOUT STATUS MIGRAINOSUS, NOT INTRACTABLE: Primary | ICD-10-CM

## 2024-10-08 PROCEDURE — 99214 OFFICE O/P EST MOD 30 MIN: CPT | Performed by: PHYSICAL THERAPIST

## 2024-10-08 RX ORDER — LORAZEPAM 1 MG/1
1 TABLET ORAL ONCE
Qty: 1 TABLET | Refills: 0 | Status: SHIPPED | OUTPATIENT
Start: 2024-10-08 | End: 2024-10-08

## 2024-10-08 RX ORDER — NORETHINDRONE AND ETHINYL ESTRADIOL 0.5-0.035
KIT ORAL
COMMUNITY
Start: 2024-10-05

## 2024-10-08 ASSESSMENT — ENCOUNTER SYMPTOMS
GASTROINTESTINAL NEGATIVE: 1
EYE PAIN: 0
TROUBLE SWALLOWING: 0
ALLERGIC/IMMUNOLOGIC NEGATIVE: 1
PHOTOPHOBIA: 1
RESPIRATORY NEGATIVE: 1
VOICE CHANGE: 0

## 2024-10-08 ASSESSMENT — PATIENT HEALTH QUESTIONNAIRE - PHQ9
SUM OF ALL RESPONSES TO PHQ QUESTIONS 1-9: 0
SUM OF ALL RESPONSES TO PHQ9 QUESTIONS 1 & 2: 0
2. FEELING DOWN, DEPRESSED OR HOPELESS: NOT AT ALL
1. LITTLE INTEREST OR PLEASURE IN DOING THINGS: NOT AT ALL
SUM OF ALL RESPONSES TO PHQ QUESTIONS 1-9: 0

## 2024-10-08 NOTE — PROGRESS NOTES
[facility] [address], [address]  [place] [address]  [phone]      No chief complaint on file.      Original HPI  Chelsea Llamas is a 38 y.o. female with past ministry of ADHD, depression, migraines, and uterine fibroids who presents with a chief complaint of worsening headaches.  Patient does suffer from headaches for multiple years that has been treated with as needed topiramate.  These headaches are generally described as either unilateral or bilateral frontal in nature.  Pounding, occasionally stabbing.  They can escalate to 10/10 pain.  Since her last fibroid removal, which was on on 9/20/2022 her headaches have been increasing in frequency.  She is now having anywhere from 12-15 headache days monthly.  5-6 of them are migrainous in nature with severe pain, light sensitivity, sound sensitivity, nausea, and worsening intensity with activity.  They can last 24 to 36 hours on average.  Are mostly better with rest, but do not always fully resolve.  These can be preceded by \"flashing light\" aura 2-3 times a month.  The other headaches per month also have photophobia, sensitivity, and occasional nausea, but can be less severe.  There is no pattern, and the headaches can start at any point time of the day, she does believe they are worse around her cycle.  She is currently on combined oral contraceptive therapy.    Patient has attempted sumatriptan, Tylenol, Advil, and Aleve as abortive therapy which have been ineffective.  Nurtec has been effective as abortive therapy.  For preventative therapy topiramate has been ineffective.     Interval history:  Patient reports that the Ubrelvy is \"taking the edge off\" of her migraines thus far.  The topiramate is not preventing the headaches.  Still having roughly 10-15 headache days per month, with 2-3 severe migraine days per month.  Her auras have decreased.  Still having flashing light type aura.    Past Medical History:   Diagnosis

## 2024-10-08 NOTE — PROGRESS NOTES
.Patient presents for loading dose  Emgality injection.     VITAL SIGNS:       Common adverse reactions including injection site reaction and constipation were reviewed with patient.     Your Emgality dose is 120 mg. (Loading dose 2 injections)  This is given as a subcutaneous injection once a month.   The syringe is very small, you will never see the syringe.     Reviewed proper use of auto injector includin. Storage of medication in refrigerator until use and remove 30 min prior to use.   Once removed from the refrigerator, the medication is good for 7 days.  Do no place back in refrigerator.   2. Gather materials needed prior to injection  3. Review injection sites  4. Clean injection site  5. Remove cap from auto injector  6. Firmly press injector at 90 degree angle against site and press the purple start button.     You will hear a loud click. Let go of the injector button.   7. Continue to press injector against you skin for about 15 seconds, the window will turn      yellow when injection is complete.  8. Dispose the auto injector in a sharps container.      Patient demonstrated proper self injection technique with sample syringe and administered first self dose properly in office.      Dose given:  Emgality*20 mg. (Loading dose 2 injections)subcutaneous injection  Site: Upper Abdomen  Lot No : [id number]  Exp date: 2026  NDC: )12YKGE65     Pt tolerated procedure without immediate adverse reaction.

## 2024-10-09 ENCOUNTER — TELEPHONE (OUTPATIENT)
Dept: NEUROLOGY | Age: 39
End: 2024-10-09

## 2024-10-14 ENCOUNTER — TELEPHONE (OUTPATIENT)
Dept: NEUROLOGY | Age: 39
End: 2024-10-14

## 2024-10-14 NOTE — TELEPHONE ENCOUNTER
Per CoverMyMeds: Chelsea Llamas (Guo: [id number])  Emgality 120MG/ML auto-injectors (migraine)  outcome: ApprovedCreated: October 8th, 2024Sent: October 8th, 2024

## 2024-10-21 ENCOUNTER — HOSPITAL ENCOUNTER (OUTPATIENT)
Dept: MRI IMAGING | Age: 39
Discharge: HOME | End: 2024-10-24
Payer: COMMERCIAL

## 2024-10-21 DIAGNOSIS — G43.E09 CHRONIC MIGRAINE WITH AURA WITHOUT STATUS MIGRAINOSUS, NOT INTRACTABLE: ICD-10-CM

## 2024-10-21 PROCEDURE — 6360000004 HC RX CONTRAST MEDICATION: Performed by: PHYSICAL THERAPIST

## 2024-10-21 PROCEDURE — 70553 MRI BRAIN STEM W/O & W/DYE: CPT

## 2024-10-21 PROCEDURE — A9579 GAD-BASE MR CONTRAST NOS,1ML: HCPCS | Performed by: PHYSICAL THERAPIST

## 2024-10-21 RX ADMIN — GADOTERIDOL 12 ML: 279.3 INJECTION, SOLUTION INTRAVENOUS at 06:55

## 2024-12-09 ENCOUNTER — OFFICE VISIT (OUTPATIENT)
Dept: NEUROLOGY | Age: 39
End: 2024-12-09
Payer: COMMERCIAL

## 2024-12-09 DIAGNOSIS — G43.E09 CHRONIC MIGRAINE WITH AURA WITHOUT STATUS MIGRAINOSUS, NOT INTRACTABLE: Primary | ICD-10-CM

## 2024-12-09 PROCEDURE — 99214 OFFICE O/P EST MOD 30 MIN: CPT | Performed by: PHYSICAL THERAPIST

## 2024-12-09 ASSESSMENT — ENCOUNTER SYMPTOMS
RESPIRATORY NEGATIVE: 1
GASTROINTESTINAL NEGATIVE: 1
TROUBLE SWALLOWING: 0
ALLERGIC/IMMUNOLOGIC NEGATIVE: 1
VOICE CHANGE: 0
EYE PAIN: 0
PHOTOPHOBIA: 1

## 2024-12-09 NOTE — PROGRESS NOTES
[facility] [address], [address]  [place] [address]  [phone]      Chief Complaint   Patient presents with    Follow-up    Migraine       Original HPI  Chelsea Llamas is a 39 y.o. female with past ministry of ADHD, depression, migraines, and uterine fibroids who presents with a chief complaint of worsening headaches.  Patient does suffer from headaches for multiple years that has been treated with as needed topiramate.  These headaches are generally described as either unilateral or bilateral frontal in nature.  Pounding, occasionally stabbing.  They can escalate to 10/10 pain.  Since her last fibroid removal, which was on on 9/20/2022 her headaches have been increasing in frequency.  She is now having anywhere from 12-15 headache days monthly.  5-6 of them are migrainous in nature with severe pain, light sensitivity, sound sensitivity, nausea, and worsening intensity with activity.  They can last 24 to 36 hours on average.  Are mostly better with rest, but do not always fully resolve.  These can be preceded by \"flashing light\" aura 2-3 times a month.  The other headaches per month also have photophobia, sensitivity, and occasional nausea, but can be less severe.  There is no pattern, and the headaches can start at any point time of the day, she does believe they are worse around her cycle.  She is currently on combined oral contraceptive therapy.    Patient has attempted sumatriptan, Tylenol, Advil, and Aleve as abortive therapy which have been ineffective.  Nurtec has been effective as abortive therapy.  For preventative therapy topiramate has been ineffective.     Interval history:  Good success with Nurtec as abortive medication.  She is off birth control now which has decreased her migraines to 2 over the last month.  Currently working well with her new OB/GYN.  Does not wish to take medication every day if she does not have to.  Would like to review her MRI

## 2025-03-04 ENCOUNTER — TELEPHONE (OUTPATIENT)
Dept: PULMONOLOGY | Age: 40
End: 2025-03-04

## 2025-03-04 DIAGNOSIS — B96.89 ACUTE BACTERIAL SINUSITIS: ICD-10-CM

## 2025-03-04 DIAGNOSIS — J06.9 UPPER RESPIRATORY TRACT INFECTION, UNSPECIFIED TYPE: Primary | ICD-10-CM

## 2025-03-04 DIAGNOSIS — J01.90 ACUTE BACTERIAL SINUSITIS: ICD-10-CM

## 2025-03-04 RX ORDER — BENZONATATE 100 MG/1
100-200 CAPSULE ORAL 3 TIMES DAILY PRN
Qty: 60 CAPSULE | Refills: 0 | Status: SHIPPED | OUTPATIENT
Start: 2025-03-04 | End: 2025-03-14

## 2025-03-04 RX ORDER — ALBUTEROL SULFATE 90 UG/1
2 INHALANT RESPIRATORY (INHALATION) 4 TIMES DAILY PRN
Qty: 18 G | Refills: 0 | Status: SHIPPED | OUTPATIENT
Start: 2025-03-04

## 2025-03-04 NOTE — TELEPHONE ENCOUNTER
Patient in hospital working and worse -- congested and b/l rhonchi. Moist cough. Left sinus tenderness on examination. No allergies. Will give her Augmentin 10 days for Acute sinusitis. Also will write for tessalon pearls and inhalers.  Recommend go home since clearly ill    Justin Langford MD

## (undated) DEVICE — CANISTER, RIGID, 2000CC: Brand: MEDLINE INDUSTRIES, INC.

## (undated) DEVICE — POUCH SPEC RETRV SHFT 15MM 13X23CM GRN POLYUR DBL RNG HNDL

## (undated) DEVICE — SUTURE V-LOC 180 SZ 0 L12IN ABSRB GRN L37MM GS-21 1/2 CIR VLOCL0316

## (undated) DEVICE — BARRIER ADH W3XL4IN UTER PELV ABSRB GYNECARE INTCEED

## (undated) DEVICE — BLADELESS OBTURATOR: Brand: WECK VISTA

## (undated) DEVICE — CANNULA SEAL

## (undated) DEVICE — ADHESIVE SKIN CLSR 0.7ML TOP DERMBND ADV

## (undated) DEVICE — ROBOTIC HYSTERECTOMY: Brand: MEDLINE INDUSTRIES, INC.

## (undated) DEVICE — AIRSEAL 5 MM ACCESS PORT AND LOW PROFILE OBTURATOR WITH BLADELESS OPTICAL TIP, 120 MM LENGTH: Brand: AIRSEAL

## (undated) DEVICE — SUTURE PLN GUT SZ 2-0 L27IN ABSRB YELLOWISH TAN L36MM CT-1 843H

## (undated) DEVICE — SINGLE BASIN: Brand: CARDINAL HEALTH

## (undated) DEVICE — SPONGE LAPAROTOMY W18XL18IN WHITE STRUNG RADIOPAQUE STERILE

## (undated) DEVICE — TOTAL TRAY, DB, 100% SILI FOLEY, 16FR 10: Brand: MEDLINE

## (undated) DEVICE — SYRINGE MED 50ML LUERLOCK TIP

## (undated) DEVICE — DECANTER FLD 9IN ST BG FOR ASEP TRNSF OF FLD

## (undated) DEVICE — TIP IU L10CM DIA6.7MM GRN SIL FLX DISP RUMI II

## (undated) DEVICE — SOLUTION IV 1000ML LAC RINGERS PH 6.5 INJ USP VIAFLX PLAS

## (undated) DEVICE — SUTURE VCRL + SZ 4-0 L27IN ABSRB UD PS-2 3/8 CIR REV CUT VCP426H

## (undated) DEVICE — SYRINGE NDL 25GA 1ML L5/8IN BLU PLAS NDL S STL SHLD HYPO

## (undated) DEVICE — SYNCHROSEAL: Brand: DA VINCI ENERGY

## (undated) DEVICE — TROCAR: Brand: KII® SLEEVE

## (undated) DEVICE — Device

## (undated) DEVICE — KIT INCLUDES: GTB17, ALEXIS CONTAINED EXTRACTION SYSTEM;  CNGL2, GELPOINT ADVANCED ACCESS PLATFORM: Brand: ALEXIS® CONTAINED EXTRACTION SYSTEM WITH GELPOINT® ADVANCED ACCESS PLATFORM

## (undated) DEVICE — COVER MPLR TIP CRV SCIS ACC DA VINCI

## (undated) DEVICE — GLOVE SURG SZ 65 THK91MIL LTX FREE SYN POLYISOPRENE

## (undated) DEVICE — ARM DRAPE

## (undated) DEVICE — TROCAR ENDOSCP L100MM DIA15MM BLDELSS STBL SL ENDOPATH XCEL

## (undated) DEVICE — SUTURE SZ 0 27IN 5/8 CIR UR-6  TAPER PT VIOLET ABSRB VICRYL J603H

## (undated) DEVICE — AIRSEAL 8 MM ACCESS PORT AND LOW PROFILE OBTURATOR WITH BLADELESS OPTICAL TIP, 120 MM LENGTH: Brand: AIRSEAL

## (undated) DEVICE — OCCLUDER UTER DISP COLPO-PNEUMO

## (undated) DEVICE — SYRINGE 20ML LL S/C 50

## (undated) DEVICE — SUTURE ABSORBABLE MONOFILAMENT 0 CT-1 36 MM DYED SPRL PDS + SXPP1B450

## (undated) DEVICE — PENCIL ES L3M BTTN SWCH HOLSTER W/ BLDE ELECTRD EDGE

## (undated) DEVICE — SOLUTION IRRIG 1000ML STRL H2O USP PLAS POUR BTL

## (undated) DEVICE — TISSUE RETRIEVAL SYSTEM: Brand: INZII RETRIEVAL SYSTEM

## (undated) DEVICE — NEEDLE SPNL 22GA L3.5IN BLK HUB S STL REG WALL FIT STYL W/

## (undated) DEVICE — COLUMN DRAPE

## (undated) DEVICE — SUTURE VCRL SZ 0 L36IN ABSRB UD L36MM CT-1 1/2 CIR J946H

## (undated) DEVICE — SUTURE MCRYL SZ 4-0 L27IN ABSRB UD L19MM PS-2 1/2 CIR PRIM Y426H